# Patient Record
Sex: FEMALE | Race: WHITE | NOT HISPANIC OR LATINO | Employment: FULL TIME | URBAN - METROPOLITAN AREA
[De-identification: names, ages, dates, MRNs, and addresses within clinical notes are randomized per-mention and may not be internally consistent; named-entity substitution may affect disease eponyms.]

---

## 2017-01-18 DIAGNOSIS — N60.09 SOLITARY CYST OF BREAST: ICD-10-CM

## 2017-02-01 ENCOUNTER — HOSPITAL ENCOUNTER (OUTPATIENT)
Dept: ULTRASOUND IMAGING | Facility: CLINIC | Age: 42
Discharge: HOME | End: 2017-02-01
Payer: COMMERCIAL

## 2017-02-01 DIAGNOSIS — N60.09 SOLITARY CYST OF BREAST: ICD-10-CM

## 2017-02-01 PROCEDURE — 76642 ULTRASOUND BREAST LIMITED: CPT

## 2017-05-26 ENCOUNTER — ALLSCRIPTS OFFICE VISIT (OUTPATIENT)
Dept: OTHER | Facility: OTHER | Age: 42
End: 2017-05-26

## 2017-07-26 ENCOUNTER — HOSPITAL ENCOUNTER (OUTPATIENT)
Dept: ULTRASOUND IMAGING | Facility: CLINIC | Age: 42
Discharge: HOME | End: 2017-07-26
Payer: COMMERCIAL

## 2017-07-26 ENCOUNTER — HOSPITAL ENCOUNTER (OUTPATIENT)
Dept: MAMMOGRAPHY | Facility: CLINIC | Age: 42
Discharge: HOME | End: 2017-07-26
Payer: COMMERCIAL

## 2017-07-26 DIAGNOSIS — Z12.31 ENCOUNTER FOR SCREENING MAMMOGRAM FOR MALIGNANT NEOPLASM OF BREAST: ICD-10-CM

## 2017-07-26 DIAGNOSIS — R92.8 ABNORMAL MAMMOGRAM, UNSPECIFIED: ICD-10-CM

## 2017-07-26 PROCEDURE — 76642 ULTRASOUND BREAST LIMITED: CPT

## 2017-07-26 PROCEDURE — G0202 SCR MAMMO BI INCL CAD: HCPCS

## 2017-07-26 PROCEDURE — 77063 BREAST TOMOSYNTHESIS BI: CPT

## 2017-07-31 ENCOUNTER — HOSPITAL ENCOUNTER (OUTPATIENT)
Dept: ULTRASOUND IMAGING | Facility: CLINIC | Age: 42
Discharge: HOME | End: 2017-07-31
Payer: COMMERCIAL

## 2017-07-31 ENCOUNTER — HOSPITAL ENCOUNTER (OUTPATIENT)
Dept: MAMMOGRAPHY | Facility: CLINIC | Age: 42
Discharge: HOME | End: 2017-07-31
Payer: COMMERCIAL

## 2017-07-31 ENCOUNTER — HOSPITAL ENCOUNTER (OUTPATIENT)
Dept: ULTRASOUND IMAGING | Facility: CLINIC | Age: 42
Discharge: HOME | End: 2017-07-31
Admitting: RADIOLOGY
Payer: COMMERCIAL

## 2017-07-31 VITALS — DIASTOLIC BLOOD PRESSURE: 64 MMHG | HEART RATE: 68 BPM | SYSTOLIC BLOOD PRESSURE: 118 MMHG

## 2017-07-31 DIAGNOSIS — R92.8 ABNORMAL FINDING ON BREAST IMAGING: ICD-10-CM

## 2017-07-31 DIAGNOSIS — N63.0 BREAST LUMP: ICD-10-CM

## 2017-07-31 DIAGNOSIS — R92.8 ABNORMAL MAMMOGRAM, UNSPECIFIED: ICD-10-CM

## 2017-07-31 PROCEDURE — 19083 BX BREAST 1ST LESION US IMAG: CPT

## 2017-07-31 PROCEDURE — 88305 TISSUE EXAM BY PATHOLOGIST: CPT | Performed by: OBSTETRICS & GYNECOLOGY

## 2017-07-31 RX ORDER — LIDOCAINE HYDROCHLORIDE 10 MG/ML
4 INJECTION, SOLUTION INFILTRATION; PERINEURAL ONCE
Status: COMPLETED | OUTPATIENT
Start: 2017-07-31 | End: 2017-07-31

## 2017-07-31 RX ADMIN — LIDOCAINE HYDROCHLORIDE 4 ML: 10 INJECTION, SOLUTION INFILTRATION; PERINEURAL at 14:40

## 2017-08-31 DIAGNOSIS — N63.0 BREAST LUMP: ICD-10-CM

## 2018-01-12 VITALS
SYSTOLIC BLOOD PRESSURE: 102 MMHG | DIASTOLIC BLOOD PRESSURE: 70 MMHG | HEIGHT: 66 IN | BODY MASS INDEX: 20.61 KG/M2 | WEIGHT: 128.25 LBS

## 2018-02-19 ENCOUNTER — HOSPITAL ENCOUNTER (OUTPATIENT)
Dept: ULTRASOUND IMAGING | Facility: CLINIC | Age: 43
Discharge: HOME | End: 2018-02-19
Payer: COMMERCIAL

## 2018-02-19 DIAGNOSIS — R92.8 ABNORMAL FINDINGS ON DIAGNOSTIC IMAGING OF BREAST: ICD-10-CM

## 2018-02-19 PROCEDURE — 76642 ULTRASOUND BREAST LIMITED: CPT

## 2018-03-24 ENCOUNTER — OFFICE VISIT (OUTPATIENT)
Dept: URGENT CARE | Age: 43
End: 2018-03-24
Payer: COMMERCIAL

## 2018-03-24 VITALS
RESPIRATION RATE: 18 BRPM | WEIGHT: 128 LBS | SYSTOLIC BLOOD PRESSURE: 120 MMHG | HEART RATE: 76 BPM | HEIGHT: 66 IN | BODY MASS INDEX: 20.57 KG/M2 | DIASTOLIC BLOOD PRESSURE: 70 MMHG | OXYGEN SATURATION: 99 % | TEMPERATURE: 97.6 F

## 2018-03-24 DIAGNOSIS — T78.40XA ALLERGIC REACTION, INITIAL ENCOUNTER: Primary | ICD-10-CM

## 2018-03-24 PROCEDURE — G0382 LEV 3 HOSP TYPE B ED VISIT: HCPCS | Performed by: FAMILY MEDICINE

## 2018-03-24 PROCEDURE — S9083 URGENT CARE CENTER GLOBAL: HCPCS | Performed by: FAMILY MEDICINE

## 2018-03-24 RX ORDER — METHYLPREDNISOLONE 4 MG/1
TABLET ORAL
Qty: 21 TABLET | Refills: 0 | Status: SHIPPED | OUTPATIENT
Start: 2018-03-24 | End: 2018-08-08

## 2018-03-24 NOTE — PROGRESS NOTES
Assessment/Plan:     Diagnoses and all orders for this visit:    Allergic reaction, initial encounter  -     Methylprednisolone 4 MG TBPK; Use as directed on package  -     F/u with your pcp      Other orders  -     sertraline (ZOLOFT) 50 mg tablet; Take 1 tablet by mouth daily        Subjective:  Patient presents to the clinic with c/o of discomfort to her face     Patient ID: Beatriz Sandra is a 36 y o  female  HPI  She reports an allergy to penicillin and says her children are currently taking amoxicillin  She uses gloves when administering the meds but has had some reaction to this in the past  Currently she reports feeling of swelling, slightly increased thickness of the lips, some numbness of her facial muscles B/L, increased feel of discomfort in her throat  However no actual difficulty with breathing  No C P, SOB, abd pain, rash, sneezing, runny nose, sinus pain or pressure or dizziness  The following portions of the patient's history were reviewed and updated as appropriate: allergies, current medications, past family history, past medical history, past social history, past surgical history and problem list     Review of Systems   Constitutional: Negative for appetite change, chills, fatigue and fever  HENT: Positive for facial swelling  Negative for congestion, ear discharge, hearing loss, nosebleeds, rhinorrhea, sinus pain, sinus pressure, sneezing, sore throat and trouble swallowing  Eyes: Negative for pain, discharge, redness and itching  Respiratory: Negative for cough, chest tightness, shortness of breath and wheezing  Cardiovascular: Negative for chest pain and palpitations  Gastrointestinal: Negative for abdominal pain, diarrhea, nausea and vomiting  Skin: Negative for pallor and rash  Neurological: Positive for numbness (mild, facial muscle, but no facial muscle weakness)  Negative for dizziness, syncope, facial asymmetry, light-headedness and headaches  Objective:    /70 (BP Location: Right arm, Patient Position: Sitting, Cuff Size: Standard)   Pulse 76   Temp 97 6 °F (36 4 °C) (Temporal)   Resp 18   Ht 5' 6" (1 676 m)   Wt 58 1 kg (128 lb)   LMP 03/03/2018   SpO2 99%   BMI 20 66 kg/m²          Physical Exam   Constitutional: She is oriented to person, place, and time  She appears well-developed and well-nourished  No distress  HENT:   Head: Normocephalic and atraumatic  Right Ear: External ear normal    Left Ear: External ear normal    Mouth/Throat: No oropharyngeal exudate  The lips seems to be slightly swollen, more on the lowe lip  Eyelids are WNL  Facial muscles look puffy, mild  No skin break  No rep distress  Eyes: Conjunctivae are normal  Pupils are equal, round, and reactive to light  Right eye exhibits no discharge  Left eye exhibits no discharge  Neck: Normal range of motion  Neck supple  No tracheal deviation present  Cardiovascular: Normal rate, regular rhythm and normal heart sounds  No murmur heard  Pulmonary/Chest: Effort normal and breath sounds normal  No respiratory distress  She has no wheezes  Musculoskeletal: Normal range of motion  She exhibits no edema  Lymphadenopathy:     She has no cervical adenopathy  Neurological: She is alert and oriented to person, place, and time  Skin: She is not diaphoretic  Psychiatric: She has a normal mood and affect   Her behavior is normal  Judgment and thought content normal

## 2018-03-24 NOTE — PATIENT INSTRUCTIONS
Anaphylaxis   WHAT YOU NEED TO KNOW:   Anaphylaxis is a life-threatening allergic reaction that must be treated immediately  Your risk for anaphylaxis increases if you have asthma that is severe or not controlled  Medical conditions such as heart disease can also increase your risk  It is important to be prepared if you are at risk for anaphylaxis  Your symptoms can be worse each time you are exposed to the trigger  DISCHARGE INSTRUCTIONS:   Steps to take for signs or symptoms of anaphylaxis:   · Immediately  give 1 shot of epinephrine only into the outer thigh muscle  · Leave the shot in place  as directed  Your healthcare provider may recommend you leave it in place for up to 10 seconds before you remove it  This helps make sure all of the epinephrine is delivered  · Call 911 and go to the emergency department,  even if the shot improved symptoms  Do not drive yourself  Bring the used epinephrine shot with you  Call 911 for any of the following:   · You have a skin rash, hives, swelling, or itching  · You have trouble breathing, shortness of breath, wheezing, or coughing  · Your throat tightens or your lips or tongue swell  · You have difficulty swallowing or speaking  · You are dizzy, lightheaded, confused, or feel like you are going to faint  · You have nausea, diarrhea, or abdominal cramps, or you are vomiting  Return to the emergency department if:   · Signs or symptoms of anaphylaxis return  Contact your healthcare provider if:   · You have questions or concerns about your condition or care  Medicines:   · Epinephrine  is used to treat severe allergic reactions such as anaphylaxis  · Medicines  such as antihistamines, steroids, and bronchodilators decrease inflammation, open airways, and make breathing easier  · Take your medicine as directed  Contact your healthcare provider if you think your medicine is not helping or if you have side effects   Tell him or her if you are allergic to any medicine  Keep a list of the medicines, vitamins, and herbs you take  Include the amounts, and when and why you take them  Bring the list or the pill bottles to follow-up visits  Carry your medicine list with you in case of an emergency  Follow up with your healthcare provider as directed: Allergy testing may reveal allergies that can trigger anaphylaxis  Write down your questions so you remember to ask them during your visits  Safety precautions:   · Keep 2 shots of epinephrine with you at all times  You may need a second shot, because epinephrine only works for about 20 minutes and symptoms may return  Your healthcare provider can show you and family members how to give the shot  Check the expiration date every month and replace it before it expires  · Create an action plan  Your healthcare provider can help you create a written plan that explains the allergy and an emergency plan to treat a reaction  The plan explains when to give a second epinephrine shot if symptoms return or do not improve after the first  Give copies of the action plan and emergency instructions to family members, work and school staff, and  providers  Show them how to give a shot of epinephrine  · Be careful when you exercise  If you have had exercise-induced anaphylaxis, do not exercise right after you eat  Stop exercising right away if you start to develop any signs or symptoms of anaphylaxis  You may first feel tired, warm, or have itchy skin  Hives, swelling, and severe breathing problems may develop if you continue to exercise  · Carry medical alert identification  Wear medical alert jewelry or carry a card that explains the allergy  Ask your healthcare provider where to get these items  · Identify and avoid known triggers  Read food labels for ingredients  Look for triggers in your environment  · Ask about treatments to prevent anaphylaxis    You may need allergy shots or other medicines to treat allergies  © 2017 [facility] Information is for End User's use only and may not be sold, redistributed or otherwise used for commercial purposes  All illustrations and images included in CareNotes® are the copyrighted property of A D A M [employer]  or [employer]  The above information is an  only  It is not intended as medical advice for individual conditions or treatments  Talk to your doctor, nurse or pharmacist before following any medical regimen to see if it is safe and effective for you

## 2018-07-18 ENCOUNTER — ANNUAL EXAM (OUTPATIENT)
Dept: OBGYN CLINIC | Facility: CLINIC | Age: 43
End: 2018-07-18
Payer: COMMERCIAL

## 2018-07-18 VITALS
BODY MASS INDEX: 20.97 KG/M2 | SYSTOLIC BLOOD PRESSURE: 100 MMHG | WEIGHT: 133.6 LBS | DIASTOLIC BLOOD PRESSURE: 68 MMHG | HEIGHT: 67 IN

## 2018-07-18 DIAGNOSIS — Z01.419 WOMEN'S ANNUAL ROUTINE GYNECOLOGICAL EXAMINATION: Primary | ICD-10-CM

## 2018-07-18 DIAGNOSIS — R92.8 ABNORMAL SCREENING MAMMOGRAM: ICD-10-CM

## 2018-07-18 PROCEDURE — S0612 ANNUAL GYNECOLOGICAL EXAMINA: HCPCS | Performed by: OBSTETRICS & GYNECOLOGY

## 2018-07-18 NOTE — PATIENT INSTRUCTIONS
The patient was informed of a stable gyn examination  No Pap smear was performed  She will watch for regarding severe she will wash the above Clomid for state  She may consider using Lysteda in the future  She also watch for any return of the premenstrual syndrome  If the above we may consider restarting Zoloft will keep me posted

## 2018-07-18 NOTE — PROGRESS NOTES
This is a 41-year-old white female she is a  2 para 2 2 prior vaginal deliveries  Her current method of contraception includes withdrawal   Year function 1 year above the premenstrual syndrome  She did times all 4 few months  She denied the side effects to the side effects improved to use the dosage  She is still happy with weight loss  She has now moved to the dose of her better  Menstrual cycles of applicable  She does with this sometimes heavy new she was offered medication the heavy this was interfering with her lifestyle  The present time she will do weight  She denies any other particular gynecological  GI complaint  She denied with a possible filling cyst of July with sheath that was all the key  There is no bowel with intimacy  There are no new major family illnesses  to report  Review of systems patient does admit 1st day menstrual cycle was heavy  She has done screening medical therapy at the present time  She also has a history of possible or warmth right ovarian cyst earlier this month  She will continue to watch it symptoms returned she will contact me  Surgical history is benign  Medical history is is benign she is severely allergic to penicillin      Social history she is a former smoker  Social alcohol      Family history is positive for gout, and hereditary spherocytosis, and hypertension      Physical exam well-developed well-nourished petite white female acute distress HEENT is within normal limits  Cardiac exam shows a regular rhythm and rate normal S1-S2 lungs clear to A&P breast exam symmetrical nontender no masses with retraction  Axilla clear bilaterally  Pelvic exam the external genitalia normal limits vagina is clean uterus is small mobile nontender the cervix is parous adnexa clear there is no cervical motion tenderness  A Pap smear was not performed because of prior history being HPV negative year   Impression stable gyn examination    No Pap smear   I do lesion my a probable ruptured ovarian cyst to continue to monitor pain worsens to contact me for further evaluation  She will talk to her family physician about so dizzy spells  It does not seem related to her menstrual cycle  She has a history of abnormal mammogram she will follow up next month along with an ultrasound of the left breast   She should return my office in 1 year

## 2018-08-06 ENCOUNTER — TELEPHONE (OUTPATIENT)
Dept: INTERNAL MEDICINE CLINIC | Facility: CLINIC | Age: 43
End: 2018-08-06

## 2018-08-06 NOTE — TELEPHONE ENCOUNTER
Pt last seen 2016  Poss hernia   Uncomfortable when sitting especially in the abdomen   Started noticing discomfort in the beginning of June   She does weight lifting 20lbs  She would like to be seen  Please advise   Thanks    Declines: bowel issues, fever   Pt tele: [phone]

## 2018-08-07 NOTE — TELEPHONE ENCOUNTER
Sp/w pt and is ok to see Reggie Martinez, she is driving now and will call Dr Butler's office directly for avail appts

## 2018-08-08 ENCOUNTER — TELEPHONE (OUTPATIENT)
Dept: INTERNAL MEDICINE CLINIC | Facility: CLINIC | Age: 43
End: 2018-08-08

## 2018-08-08 ENCOUNTER — APPOINTMENT (OUTPATIENT)
Dept: LAB | Facility: CLINIC | Age: 43
End: 2018-08-08
Payer: COMMERCIAL

## 2018-08-08 ENCOUNTER — OFFICE VISIT (OUTPATIENT)
Dept: INTERNAL MEDICINE CLINIC | Facility: CLINIC | Age: 43
End: 2018-08-08
Payer: COMMERCIAL

## 2018-08-08 VITALS
HEIGHT: 66 IN | WEIGHT: 133.4 LBS | TEMPERATURE: 98 F | HEART RATE: 64 BPM | OXYGEN SATURATION: 100 % | BODY MASS INDEX: 21.44 KG/M2 | DIASTOLIC BLOOD PRESSURE: 72 MMHG | SYSTOLIC BLOOD PRESSURE: 118 MMHG | RESPIRATION RATE: 16 BRPM

## 2018-08-08 DIAGNOSIS — K43.9 VENTRAL HERNIA WITHOUT OBSTRUCTION OR GANGRENE: ICD-10-CM

## 2018-08-08 DIAGNOSIS — R11.0 NAUSEA: ICD-10-CM

## 2018-08-08 DIAGNOSIS — R10.11 COLICKY RUQ ABDOMINAL PAIN: ICD-10-CM

## 2018-08-08 DIAGNOSIS — R10.11 COLICKY RUQ ABDOMINAL PAIN: Primary | ICD-10-CM

## 2018-08-08 DIAGNOSIS — D58.0 HEREDITARY SPHEROCYTOSIS (HCC): ICD-10-CM

## 2018-08-08 LAB
ALBUMIN SERPL BCP-MCNC: 4.5 G/DL (ref 3.5–5)
ALP SERPL-CCNC: 55 U/L (ref 46–116)
ALT SERPL W P-5'-P-CCNC: 29 U/L (ref 12–78)
ANION GAP SERPL CALCULATED.3IONS-SCNC: 5 MMOL/L (ref 4–13)
AST SERPL W P-5'-P-CCNC: 18 U/L (ref 5–45)
BASOPHILS # BLD AUTO: 0.03 THOUSANDS/ΜL (ref 0–0.1)
BASOPHILS NFR BLD AUTO: 1 % (ref 0–1)
BILIRUB SERPL-MCNC: 1.5 MG/DL (ref 0.2–1)
BILIRUB UR QL STRIP: NEGATIVE
BUN SERPL-MCNC: 11 MG/DL (ref 5–25)
CALCIUM SERPL-MCNC: 8.5 MG/DL (ref 8.3–10.1)
CHLORIDE SERPL-SCNC: 105 MMOL/L (ref 100–108)
CLARITY UR: CLEAR
CO2 SERPL-SCNC: 29 MMOL/L (ref 21–32)
COLOR UR: YELLOW
CREAT SERPL-MCNC: 0.7 MG/DL (ref 0.6–1.3)
EOSINOPHIL # BLD AUTO: 0.03 THOUSAND/ΜL (ref 0–0.61)
EOSINOPHIL NFR BLD AUTO: 1 % (ref 0–6)
ERYTHROCYTE [DISTWIDTH] IN BLOOD BY AUTOMATED COUNT: 15.3 % (ref 11.6–15.1)
GFR SERPL CREATININE-BSD FRML MDRD: 106 ML/MIN/1.73SQ M
GLUCOSE SERPL-MCNC: 95 MG/DL (ref 65–140)
GLUCOSE UR STRIP-MCNC: NEGATIVE MG/DL
HCT VFR BLD AUTO: 34.3 % (ref 34.8–46.1)
HGB BLD-MCNC: 11.9 G/DL (ref 11.5–15.4)
HGB UR QL STRIP.AUTO: NEGATIVE
IMM GRANULOCYTES # BLD AUTO: 0.01 THOUSAND/UL (ref 0–0.2)
IMM GRANULOCYTES NFR BLD AUTO: 0 % (ref 0–2)
KETONES UR STRIP-MCNC: NEGATIVE MG/DL
LEUKOCYTE ESTERASE UR QL STRIP: NEGATIVE
LIPASE SERPL-CCNC: 103 U/L (ref 73–393)
LYMPHOCYTES # BLD AUTO: 0.88 THOUSANDS/ΜL (ref 0.6–4.47)
LYMPHOCYTES NFR BLD AUTO: 16 % (ref 14–44)
MCH RBC QN AUTO: 30.1 PG (ref 26.8–34.3)
MCHC RBC AUTO-ENTMCNC: 34.7 G/DL (ref 31.4–37.4)
MCV RBC AUTO: 87 FL (ref 82–98)
MONOCYTES # BLD AUTO: 0.29 THOUSAND/ΜL (ref 0.17–1.22)
MONOCYTES NFR BLD AUTO: 5 % (ref 4–12)
NEUTROPHILS # BLD AUTO: 4.37 THOUSANDS/ΜL (ref 1.85–7.62)
NEUTS SEG NFR BLD AUTO: 77 % (ref 43–75)
NITRITE UR QL STRIP: NEGATIVE
NRBC BLD AUTO-RTO: 0 /100 WBCS
PH UR STRIP.AUTO: 7 [PH] (ref 4.5–8)
PLATELET # BLD AUTO: 141 THOUSANDS/UL (ref 149–390)
PMV BLD AUTO: 9.6 FL (ref 8.9–12.7)
POTASSIUM SERPL-SCNC: 4.3 MMOL/L (ref 3.5–5.3)
PROT SERPL-MCNC: 7.1 G/DL (ref 6.4–8.2)
PROT UR STRIP-MCNC: NEGATIVE MG/DL
RBC # BLD AUTO: 3.96 MILLION/UL (ref 3.81–5.12)
SODIUM SERPL-SCNC: 139 MMOL/L (ref 136–145)
SP GR UR STRIP.AUTO: <=1.005 (ref 1–1.03)
UROBILINOGEN UR QL STRIP.AUTO: 0.2 E.U./DL
WBC # BLD AUTO: 5.61 THOUSAND/UL (ref 4.31–10.16)

## 2018-08-08 PROCEDURE — 36415 COLL VENOUS BLD VENIPUNCTURE: CPT | Performed by: INTERNAL MEDICINE

## 2018-08-08 PROCEDURE — 80053 COMPREHEN METABOLIC PANEL: CPT

## 2018-08-08 PROCEDURE — 3008F BODY MASS INDEX DOCD: CPT | Performed by: INTERNAL MEDICINE

## 2018-08-08 PROCEDURE — 81003 URINALYSIS AUTO W/O SCOPE: CPT | Performed by: INTERNAL MEDICINE

## 2018-08-08 PROCEDURE — 83690 ASSAY OF LIPASE: CPT

## 2018-08-08 PROCEDURE — 85025 COMPLETE CBC W/AUTO DIFF WBC: CPT | Performed by: INTERNAL MEDICINE

## 2018-08-08 PROCEDURE — 99214 OFFICE O/P EST MOD 30 MIN: CPT | Performed by: INTERNAL MEDICINE

## 2018-08-08 NOTE — PROGRESS NOTES
Assessment/Plan:     Diagnoses and all orders for this visit:    Colicky RUQ abdominal pain  Comments:  sx concerning for cholecystitis/gallstones but cannot r/o pancreatitis, colitis  US and BW Ordered & precautions advised  Orders:  -     US right upper quadrant; Future  -     Comprehensive metabolic panel; Future  -     CBC and differential  -     Lipase; Future  -     UA w Reflex to Microscopic w Reflex to Culture -Lab Collect    Nausea  -     US right upper quadrant; Future  -     Comprehensive metabolic panel; Future  -     CBC and differential  -     Lipase; Future  -     UA w Reflex to Microscopic w Reflex to Culture -Lab Collect    Hereditary spherocytosis (HCC)  Comments:  advised on supplemental folic acid to take every day  f/u with PCP/Dr Garcia    Ventral hernia without obstruction or gangrene  Comments:  small, non-tender on exam but reducible  precautions advised    Other orders  -     Cancel: Comprehensive metabolic panel; Future  -     Cancel: CBC and differential  -     Cancel: Lipase; Future  -     Cancel: UA w Reflex to Microscopic w Reflex to Culture -Lab Collect          Subjective:      Patient ID: Cris Sierra is a 39 y o  female  HPI    New to me here with c/o right sided abd pain off/on for 2 months, sees Dr Lisa Mckeon as PCP  Sx intermittent but worsening and with occ mild nausea but no vomiting, fever, change in bowels or severe pain  No abd surgery in past   Believes she may have small ventral hernia from years ago, not addressed  Right sided abd pain worse after eating meal   Hx of hereditary spherocytosis - not taking folic acid supplement for some time  No pain today and no other complaints      Past Medical History:   Diagnosis Date    Allergic     Anemia     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 ([facility] [address] )     Mitral valve prolapse      Vitals:    08/08/18 0950   BP: 118/72   Pulse: 64   Resp: 16   Temp: 98 °F (36 7 °C)   SpO2: 100%   Weight: 60 5 kg (133 lb 6 4 oz) Height: 5' 6" (1 676 m)     Body mass index is 21 53 kg/m²  No current outpatient prescriptions on file  Allergies   Allergen Reactions    Penicillins Anaphylaxis and Hives    Cephalexin          Review of Systems   Constitutional: Negative for fever  HENT: Negative for congestion  Eyes: Negative for visual disturbance  Respiratory: Negative for shortness of breath  Cardiovascular: Negative for chest pain  Gastrointestinal: Positive for abdominal pain and nausea  Negative for blood in stool, constipation, diarrhea and vomiting  Endocrine: Negative for polyuria  Genitourinary: Negative for difficulty urinating  Musculoskeletal: Negative for arthralgias  Neurological: Negative for headaches  Psychiatric/Behavioral: Negative for dysphoric mood  Objective:      /72   Pulse 64   Temp 98 °F (36 7 °C)   Resp 16   Ht 5' 6" (1 676 m)   Wt 60 5 kg (133 lb 6 4 oz)   LMP 07/11/2018 (Exact Date)   SpO2 100%   BMI 21 53 kg/m²          Physical Exam   Constitutional: She appears well-developed and well-nourished  HENT:   Head: Normocephalic and atraumatic  Mouth/Throat: Oropharynx is clear and moist    Eyes: Conjunctivae are normal  Pupils are equal, round, and reactive to light  Cardiovascular: Normal rate, regular rhythm and normal heart sounds  No murmur heard  Pulmonary/Chest: Effort normal and breath sounds normal  She has no wheezes  She has no rales  Abdominal: Soft  Bowel sounds are normal  There is no tenderness  There is no rigidity, no rebound, no guarding and negative Mckenna's sign  A hernia is present  Hernia confirmed positive in the ventral area (see diagram)  Musculoskeletal: She exhibits no edema  Neurological: She is alert  Psychiatric: She has a normal mood and affect  Her behavior is normal    Vitals reviewed

## 2018-08-08 NOTE — TELEPHONE ENCOUNTER
BW and urine tests are back    Results look fine except for mild increase in bilirubin    Pls continue with current plan & Ultrasound test    Results for orders placed or performed in visit on 08/08/18   Comprehensive metabolic panel   Result Value Ref Range    Sodium 139 136 - 145 mmol/L    Potassium 4 3 3 5 - 5 3 mmol/L    Chloride 105 100 - 108 mmol/L    CO2 29 21 - 32 mmol/L    Anion Gap 5 4 - 13 mmol/L    BUN 11 5 - 25 mg/dL    Creatinine 0 70 0 60 - 1 30 mg/dL    Glucose 95 65 - 140 mg/dL    Calcium 8 5 8 3 - 10 1 mg/dL    AST 18 5 - 45 U/L    ALT 29 12 - 78 U/L    Alkaline Phosphatase 55 46 - 116 U/L    Total Protein 7 1 6 4 - 8 2 g/dL    Albumin 4 5 3 5 - 5 0 g/dL    Total Bilirubin 1 50 (H) 0 20 - 1 00 mg/dL    eGFR 106 ml/min/1 73sq m   Lipase   Result Value Ref Range    Lipase 103 73 - 393 u/L

## 2018-08-08 NOTE — PATIENT INSTRUCTIONS
1  Blood work today  2  Ultrasound today  3  Low fat diet  4  If symptoms worsen, go to Emergency Room  5  I will call you with results later today    Abdominal Pain   AMBULATORY CARE:   Abdominal pain  can be dull, achy, or sharp  You may have pain in one area of your abdomen, or in your entire abdomen  Your pain may be caused by a condition such as constipation, food sensitivity or poisoning, infection, or a blockage  Abdominal pain can also be from a hernia, appendicitis, or an ulcer  Liver, gallbladder, or kidney conditions can also cause abdominal pain  The cause of your abdominal pain may be unknown  Seek care immediately if:   · You have new chest pain or shortness of breath  · You have pulsing pain in your upper abdomen or lower back that suddenly becomes constant  · Your pain is in the right lower abdominal area and worsens with movement  · You have a fever over 100 4°F (38°C) or shaking chills  · You are vomiting and cannot keep food or liquids down  · Your pain does not improve or gets worse over the next 8 to 12 hours  · You see blood in your vomit or bowel movements, or they look black and tarry  · Your skin or the whites of your eyes turn yellow  · You are a woman and have a large amount of vaginal bleeding that is not your monthly period  Contact your healthcare provider if:   · You have pain in your lower back  · You are a man and have pain in your testicles  · You have pain when you urinate  · You have questions or concerns about your condition or care  Treatment for abdominal pain  may include medicine to calm your stomach, prevent vomiting, or decrease pain  Follow up with your healthcare provider as directed:  Write down your questions so you remember to ask them during your visits  © 2017 [address] [employer] Information is for End User's use only and may not be sold, redistributed or otherwise used for commercial purposes   All illustrations and images included in CareNotes® are the copyrighted property of [employer] Inc  or Jeth[employer] Ferrer  The above information is an  only  It is not intended as medical advice for individual conditions or treatments  Talk to your doctor, nurse or pharmacist before following any medical regimen to see if it is safe and effective for you

## 2018-08-09 ENCOUNTER — HOSPITAL ENCOUNTER (OUTPATIENT)
Dept: RADIOLOGY | Age: 43
Discharge: HOME | End: 2018-08-09
Payer: COMMERCIAL

## 2018-08-09 ENCOUNTER — DOCUMENTATION (OUTPATIENT)
Dept: INTERNAL MEDICINE CLINIC | Facility: CLINIC | Age: 43
End: 2018-08-09

## 2018-08-09 ENCOUNTER — TELEPHONE (OUTPATIENT)
Dept: INTERNAL MEDICINE CLINIC | Facility: CLINIC | Age: 43
End: 2018-08-09

## 2018-08-09 DIAGNOSIS — D58.0 HEREDITARY SPHEROCYTOSIS (HCC): Primary | ICD-10-CM

## 2018-08-09 DIAGNOSIS — R10.11 COLICKY RUQ ABDOMINAL PAIN: ICD-10-CM

## 2018-08-09 DIAGNOSIS — R11.0 NAUSEA: ICD-10-CM

## 2018-08-09 DIAGNOSIS — R17 ELEVATED BILIRUBIN: ICD-10-CM

## 2018-08-09 PROCEDURE — 76705 ECHO EXAM OF ABDOMEN: CPT

## 2018-08-09 NOTE — TELEPHONE ENCOUNTER
Spoke to patient    Reviewed US results    Pt still having off/on discomfort symptoms in RUQ    US neg for sludge or gallstones    Hx of hereditary spherocytosis    Plan - HIDA scan & provided phone # to call/schedule on pt's cell VM         Strict precautions advised

## 2018-08-09 NOTE — TELEPHONE ENCOUNTER
US is back and looks fine, no evidence of gallstones or gallbladder inflammation    There was a small 3 mm area on right kidney which appears to be an enlarged blood vessel or area of fatty tissue  Should repeat ultrasound in 6 months to follow this up, can do this with Dr Rosalba Ball    Is Mavis Solo still having pain?   If yes, recommend CT scan of abdomen    Let me know

## 2018-08-09 NOTE — TELEPHONE ENCOUNTER
States she just feels a discomfort on Right side of abdomen on and off for the past 2 months  Patient would like to know if 3 mm area on R kidney may be the cause of her discomfort

## 2018-08-10 ENCOUNTER — DOCUMENTATION (OUTPATIENT)
Dept: INTERNAL MEDICINE CLINIC | Facility: CLINIC | Age: 43
End: 2018-08-10

## 2018-08-10 NOTE — PROGRESS NOTES
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

## 2018-08-13 DIAGNOSIS — E53.8 FOLIC ACID DEFICIENCY: Primary | ICD-10-CM

## 2018-08-13 RX ORDER — FOLIC ACID 1 MG/1
1 TABLET ORAL DAILY
Qty: 90 TABLET | Refills: 3 | Status: SHIPPED | OUTPATIENT
Start: 2018-08-13 | End: 2021-12-28

## 2018-08-14 ENCOUNTER — TELEPHONE (OUTPATIENT)
Dept: INTERNAL MEDICINE CLINIC | Facility: CLINIC | Age: 43
End: 2018-08-14

## 2018-08-15 NOTE — TELEPHONE ENCOUNTER
PT IS ALREADY AWARE OF HER US RESULTS AS DR KLEIN WENT OVER THEM WITH HER  I DID LEAVE HER A MESSAGE LETTING HER KNOW THAT DR Lula Hdz SAID THAT SHE HAS A LONG STANDING MILD INCREASE IN HER BILIRUBIN WHICH MEANS NOTHING AND THAT HE AGREES WITH EVERYTHING DR KLEIN SAID/DID  I ALSO TOLD HER TO STILL HAVE THE HIDA SCAN DONE AND WE'LL WAIT FOR THOSE RESULTS AND GO FROM THERE   I TOLD PT TO CALL BACK IF SHE HAS ANY QUESTIONS

## 2018-09-07 ENCOUNTER — HOSPITAL ENCOUNTER (OUTPATIENT)
Dept: NON INVASIVE DIAGNOSTICS | Facility: CLINIC | Age: 43
Discharge: HOME | End: 2018-09-07
Payer: COMMERCIAL

## 2018-09-07 ENCOUNTER — HOSPITAL ENCOUNTER (OUTPATIENT)
Dept: MAMMOGRAPHY | Facility: CLINIC | Age: 43
Discharge: HOME | End: 2018-09-07
Payer: COMMERCIAL

## 2018-09-07 ENCOUNTER — HOSPITAL ENCOUNTER (OUTPATIENT)
Dept: ULTRASOUND IMAGING | Facility: CLINIC | Age: 43
Discharge: HOME | End: 2018-09-07
Payer: COMMERCIAL

## 2018-09-07 ENCOUNTER — TELEPHONE (OUTPATIENT)
Dept: INTERNAL MEDICINE CLINIC | Facility: CLINIC | Age: 43
End: 2018-09-07

## 2018-09-07 DIAGNOSIS — R92.8 FOLLOW-UP EXAMINATION OF ABNORMAL MAMMOGRAM: ICD-10-CM

## 2018-09-07 DIAGNOSIS — D58.0 HEREDITARY SPHEROCYTOSIS (HCC): ICD-10-CM

## 2018-09-07 DIAGNOSIS — R10.11 COLICKY RUQ ABDOMINAL PAIN: ICD-10-CM

## 2018-09-07 DIAGNOSIS — R92.8 ABNORMAL FINDING ON MAMMOGRAPHY: ICD-10-CM

## 2018-09-07 DIAGNOSIS — R17 ELEVATED BILIRUBIN: ICD-10-CM

## 2018-09-07 PROCEDURE — G0279 TOMOSYNTHESIS, MAMMO: HCPCS

## 2018-09-07 PROCEDURE — 77066 DX MAMMO INCL CAD BI: CPT

## 2018-09-07 PROCEDURE — A9537 TC99M MEBROFENIN: HCPCS

## 2018-09-07 PROCEDURE — 78227 HEPATOBIL SYST IMAGE W/DRUG: CPT

## 2018-09-07 PROCEDURE — 76642 ULTRASOUND BREAST LIMITED: CPT

## 2018-09-07 RX ADMIN — SINCALIDE 1.2 MCG: 5 INJECTION, POWDER, LYOPHILIZED, FOR SOLUTION INTRAVENOUS at 08:55

## 2018-09-07 NOTE — TELEPHONE ENCOUNTER
Let carlos enrique know - HIDA scan results are back & look fine, no gallbladder inflammation or dysfunction    Is Lashae Hernandez still having abdominal discomfort/pain? If yes, should see a GI specialist or Dr Evon Arellano to follow up   may need further work up or a CAT scan

## 2018-09-10 ENCOUNTER — TELEPHONE (OUTPATIENT)
Dept: OBGYN CLINIC | Facility: CLINIC | Age: 43
End: 2018-09-10

## 2018-09-10 NOTE — TELEPHONE ENCOUNTER
Pt informed of (L) breast u/s results - benign (L) breast nodule - can resume annual 3D mgram screening - can have ABUS f/u "extremely dense" breasts - given dx & cpt codes for same - she will recall office if wants to schedule ABUS

## 2018-09-10 NOTE — TELEPHONE ENCOUNTER
SPOKE W/ PT A ND ADV'D  STATES SHE DOES SOMETIMES HAVE PAIN  NOTICES IT SOMETIMES BEFORE HER PERIOD  RIGHT NOW SHE IS FINE

## 2018-09-14 ENCOUNTER — OFFICE VISIT (OUTPATIENT)
Dept: INTERNAL MEDICINE CLINIC | Facility: CLINIC | Age: 43
End: 2018-09-14
Payer: COMMERCIAL

## 2018-09-14 VITALS
WEIGHT: 132 LBS | BODY MASS INDEX: 21.31 KG/M2 | HEART RATE: 72 BPM | DIASTOLIC BLOOD PRESSURE: 70 MMHG | RESPIRATION RATE: 14 BRPM | SYSTOLIC BLOOD PRESSURE: 98 MMHG

## 2018-09-14 DIAGNOSIS — R10.84 GENERALIZED ABDOMINAL PAIN: Primary | ICD-10-CM

## 2018-09-14 PROBLEM — D58.0 HEREDITARY SPHEROCYTOSIS (HCC): Chronic | Status: ACTIVE | Noted: 2018-08-08

## 2018-09-14 PROBLEM — R10.9 ABDOMINAL PAIN: Chronic | Status: ACTIVE | Noted: 2018-09-14

## 2018-09-14 PROBLEM — R10.9 ABDOMINAL PAIN: Status: ACTIVE | Noted: 2018-09-14

## 2018-09-14 PROBLEM — R17 ELEVATED BILIRUBIN: Chronic | Status: ACTIVE | Noted: 2018-08-09

## 2018-09-14 PROCEDURE — 99214 OFFICE O/P EST MOD 30 MIN: CPT | Performed by: INTERNAL MEDICINE

## 2018-09-14 NOTE — PROGRESS NOTES
Assessment/Plan:  1  Abdominal distention-can be more prominent in the right upper quadrant but can notice it throughout the abdomen-worse with certain positions and worse around her menses  Ultrasound of abdomen benign  HIDA scan negative  CBC SMA lipase normal   Because she drinks well water we need to make sure we do not have any parasites-specifically Giardia  She is concerned about endometriosis although I explained to her that typically that would not give upper abdominal pain although rarely  She will have an ultrasound of her pelvis as well  She will begin Metamucil teaspoon daily as she states her symptoms were worse if there is any tendency towards constipation  She was given Levsin to use sublingually q 4 hours p r n  for abdominal bloating  She will have a stool for Giardia, stool for O& P  She will have an ultrasound of her pelvis  Celiac profile ordered as well as sed rate C-reactive protein  She will be re-evaluated in 6 weeks  2  Abnormal ultrasound the kidney-tiny angiomyolipoma-3 millimeters-right upper pole-needs repeat ultrasound to track this in 6 months which will be due February of 2019 and we reviewed that today  3  Hereditary spherocytosis-continues on folic acid  4  Facial swelling-full discussion as per noted November 2016-asymptomatic allergy blood work then showed allergies to dust mites and ragweed but otherwise normal   She did not reacted dogs which she has at home    All other problems as per prior records  Returning in 6 weeks  Needs summary dictation next visit    Addendum-ultrasound of the pelvis benign other than probable corpus luteal cyst of the left ovary and potential pelvic congestion on the left side-at next visit will make sure patient is being re-evaluated by her gyn      No problem-specific Assessment & Plan notes found for this encounter         Diagnoses and all orders for this visit:    Generalized abdominal pain          Subjective:      Patient ID: Mikael Steven is a 39 y o  female  She was brought in today to go over GI symptoms  She notes some intermittent right upper quadrant fullness-bloating  She says especially around the time [facility] feels as though her abdomen is more protuberant  She says that she does not have severe pain  She can have fullness-mild achiness  Symptoms are most prominent around her menses insert positions such as bending over  She may notice that her abdomen is more distended after exercise  She does not have sharp pain  She does not have pain that wakes her from sleep  She had seen covering physician and had a negative ultrasound of the right upper quadrant HIDA scan is CBC Chem profile and lipase  She does drink well water and she told me today that there was an issue where there was abnormal water found  She is now drinking bottled water  She denies any family history of celiac disease-she denies weight loss  Appetite has been stable  She has not had any fever or bloody stool  She commented that it is noted if at all constipated her bloating is worse  She is worried about potential endometriosis  She EKG notes lower abdominal discomfort  She saw her gyn recently was told exam was benign  She denies episodic abdominal pain  I told her based on history today I was not suspicious for biliary tract disease at this point  The following portions of the patient's history were reviewed and updated as appropriate: current medications, past family history, past medical history, past social history, past surgical history and problem list     Review of Systems   Constitutional: Negative  Respiratory: Negative  Cardiovascular: Negative  Gastrointestinal: Positive for abdominal distention  Endocrine: Negative  Genitourinary: Negative  Musculoskeletal: Negative  Neurological: Negative  Hematological: Negative  Psychiatric/Behavioral: Negative            Objective:      BP 98/70   Pulse 72 Resp 14   Wt 59 9 kg (132 lb)   BMI 21 31 kg/m²          Physical Exam   Constitutional: She is oriented to person, place, and time  She appears well-developed and well-nourished  No distress  HENT:   Head: Normocephalic and atraumatic  Right Ear: External ear normal    Left Ear: External ear normal    Nose: Nose normal    Mouth/Throat: Oropharynx is clear and moist  No oropharyngeal exudate  Eyes: Conjunctivae and EOM are normal  Pupils are equal, round, and reactive to light  Right eye exhibits no discharge  Left eye exhibits no discharge  No scleral icterus  Neck: Normal range of motion  Neck supple  No JVD present  No tracheal deviation present  No thyromegaly present  Cardiovascular: Normal rate, regular rhythm and intact distal pulses  Exam reveals no gallop and no friction rub  No murmur heard  Pulmonary/Chest: Effort normal and breath sounds normal  No respiratory distress  She has no wheezes  She has no rales  She exhibits no tenderness  Abdominal: Soft  Bowel sounds are normal  She exhibits no distension and no mass  There is no tenderness  There is no rebound and no guarding  Genitourinary: Rectal exam shows guaiac negative stool  Musculoskeletal: Normal range of motion  She exhibits no edema or deformity  Lymphadenopathy:     She has no cervical adenopathy  Neurological: She is alert and oriented to person, place, and time  She has normal reflexes  No cranial nerve deficit  She exhibits normal muscle tone  Coordination normal    Skin: Skin is warm and dry  No rash noted  No erythema  Psychiatric: She has a normal mood and affect   Her behavior is normal  Judgment and thought content normal

## 2018-09-28 ENCOUNTER — HOSPITAL ENCOUNTER (OUTPATIENT)
Dept: RADIOLOGY | Age: 43
Discharge: HOME | End: 2018-09-28
Payer: COMMERCIAL

## 2018-09-28 DIAGNOSIS — R10.84 GENERALIZED ABDOMINAL PAIN: ICD-10-CM

## 2018-09-28 PROCEDURE — 76856 US EXAM PELVIC COMPLETE: CPT

## 2018-09-28 PROCEDURE — 76830 TRANSVAGINAL US NON-OB: CPT

## 2018-09-29 ENCOUNTER — TELEPHONE (OUTPATIENT)
Dept: INTERNAL MEDICINE CLINIC | Facility: CLINIC | Age: 43
End: 2018-09-29

## 2018-09-29 NOTE — TELEPHONE ENCOUNTER
----- Message from Srinivasa Hernadez MD sent at 9/29/2018  7:00 AM EDT -----  Please call patient letter no ultrasound of the pelvis benign showing only benign sees the left ovary-Good News

## 2018-10-30 ENCOUNTER — OFFICE VISIT (OUTPATIENT)
Dept: INTERNAL MEDICINE CLINIC | Facility: CLINIC | Age: 43
End: 2018-10-30
Payer: COMMERCIAL

## 2018-10-30 VITALS
DIASTOLIC BLOOD PRESSURE: 80 MMHG | SYSTOLIC BLOOD PRESSURE: 110 MMHG | HEART RATE: 72 BPM | WEIGHT: 132.4 LBS | BODY MASS INDEX: 21.37 KG/M2 | RESPIRATION RATE: 14 BRPM

## 2018-10-30 DIAGNOSIS — D17.9 ANGIOMYOLIPOMA: ICD-10-CM

## 2018-10-30 DIAGNOSIS — R93.5 ABNORMAL US (ULTRASOUND) OF ABDOMEN: Primary | ICD-10-CM

## 2018-10-30 PROBLEM — D17.71 RENAL ANGIOMYOLIPOMA: Chronic | Status: ACTIVE | Noted: 2018-10-30

## 2018-10-30 PROBLEM — D17.71 RENAL ANGIOMYOLIPOMA: Status: ACTIVE | Noted: 2018-10-30

## 2018-10-30 PROCEDURE — 99214 OFFICE O/P EST MOD 30 MIN: CPT | Performed by: INTERNAL MEDICINE
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
## 2019-01-07 ENCOUNTER — OFFICE VISIT (OUTPATIENT)
Dept: URGENT CARE | Age: 44
End: 2019-01-07
Payer: COMMERCIAL

## 2019-01-07 VITALS
WEIGHT: 130 LBS | BODY MASS INDEX: 20.98 KG/M2 | RESPIRATION RATE: 20 BRPM | OXYGEN SATURATION: 100 % | TEMPERATURE: 98.6 F | SYSTOLIC BLOOD PRESSURE: 116 MMHG | DIASTOLIC BLOOD PRESSURE: 56 MMHG | HEART RATE: 73 BPM

## 2019-01-07 DIAGNOSIS — J20.8 ACUTE BRONCHITIS DUE TO OTHER SPECIFIED ORGANISMS: Primary | ICD-10-CM

## 2019-01-07 PROCEDURE — S9083 URGENT CARE CENTER GLOBAL: HCPCS | Performed by: FAMILY MEDICINE

## 2019-01-07 PROCEDURE — G0382 LEV 3 HOSP TYPE B ED VISIT: HCPCS | Performed by: FAMILY MEDICINE

## 2019-01-07 RX ORDER — AZITHROMYCIN 250 MG/1
TABLET, FILM COATED ORAL
Qty: 6 TABLET | Refills: 0 | Status: SHIPPED | OUTPATIENT
Start: 2019-01-07 | End: 2019-01-11

## 2019-01-07 NOTE — LETTER
January 7, 2019     Patient: Jan Rosales   YOB: 1975   Date of Visit: 1/7/2019       To Whom It May Concern: It is my medical opinion that Christina Viera may return to work on 1/8/19  If you have any questions or concerns, please don't hesitate to call           Sincerely,        Sarah Skelton PA-C    CC: No Recipients

## 2019-01-07 NOTE — PATIENT INSTRUCTIONS
Take antibiotics as prescribed  Use nasal spray for symptomatic treatment  Stay hydrated with lots of water/fluids  Follow-up with PCP in the next few days for reexamination and to ensure resolution of symptoms  Go to the ED if any intractable fevers, unable to stay hydrated, abdominal pain, chest pain, shortness of breath, new or worsening symptoms or other concerning symptoms

## 2019-01-07 NOTE — PROGRESS NOTES
[facility]        NAME: Jessica Goncalves is a 39 y o  female  : 1975    MRN: [id number]  DATE: 2019  TIME: 10:05 AM    Assessment and Plan   Acute bronchitis due to other specified organisms [J20 8]  1  Acute bronchitis due to other specified organisms  azithromycin (ZITHROMAX) 250 mg tablet         Patient Instructions     Take antibiotics as prescribed  Use nasal spray for symptomatic treatment  Stay hydrated with lots of water/fluids  Follow-up with PCP in the next few days for reexamination and to ensure resolution of symptoms  Go to the ED if any intractable fevers, unable to stay hydrated, abdominal pain, chest pain, shortness of breath, new or worsening symptoms or other concerning symptoms  Chief Complaint     Chief Complaint   Patient presents with    Cough     nasal congestion since last thursday         History of Present Illness       40 y/o female presents with mild intermittent productive cough, nasal congestion and sore throat x 6 days  Has tried Delsum and advil with minimal relief  Denies fevers, chills, body aches, chest pain, shortness of breath, GI/ symptoms or other complaints  Sick contacts with URI  States when she gets like this she typically gets on antibiotic which helped  States she has had the Zpacks with no problem        Review of Systems   Review of Systems   Constitutional: Negative for chills and fever  HENT: Positive for congestion, rhinorrhea and sore throat  Negative for ear pain, facial swelling, sinus pain, sinus pressure, trouble swallowing and voice change  Eyes: Negative for discharge, itching and visual disturbance  Respiratory: Positive for cough  Negative for chest tightness, shortness of breath and wheezing  Cardiovascular: Negative for chest pain  Gastrointestinal: Negative for abdominal pain, diarrhea, nausea and vomiting  Musculoskeletal: Negative for back pain and neck pain  Skin: Negative for rash  Neurological: Negative for dizziness, syncope, weakness, numbness and headaches  All other systems reviewed and are negative  Current Medications       Current Outpatient Prescriptions:     folic acid (FOLVITE) 1 mg tablet, Take 1 tablet (1 mg total) by mouth daily, Disp: 90 tablet, Rfl: 3    azithromycin (ZITHROMAX) 250 mg tablet, Take 2 tablets today then 1 tablet daily x 4 days, Disp: 6 tablet, Rfl: 0    Current Allergies     Allergies as of 01/07/2019 - Reviewed 01/07/2019   Allergen Reaction Noted    Penicillins Anaphylaxis and Hives 01/04/2013    Cephalexin  01/04/2013            The following portions of the patient's history were reviewed and updated as appropriate: allergies, current medications, past family history, past medical history, past social history, past surgical history and problem list      Past Medical History:   Diagnosis Date    Allergic     Anemia     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 ([address] )     Mitral valve prolapse        Past Surgical History:   Procedure Laterality Date    DENTAL SURGERY      US GUIDED BREAST BIOPSY LEFT COMPLETE Left 7/31/2017       Family History   Problem Relation Age of Onset    Hypertension Family     Gout Family     Coronary artery disease Family     Hereditary spherocytosis Family          Medications have been verified  Objective   /56   Pulse 73   Temp 98 6 °F (37 °C) (Temporal)   Resp 20   Wt 59 kg (130 lb)   LMP 12/20/2018 (Approximate)   SpO2 100%   BMI 20 98 kg/m²        Physical Exam     Physical Exam   Constitutional: She is oriented to person, place, and time  She appears well-developed and well-nourished  No distress  HENT:   Head: Normocephalic and atraumatic  Right Ear: Hearing, tympanic membrane, external ear and ear canal normal  No mastoid tenderness  Left Ear: Hearing, tympanic membrane, external ear and ear canal normal  No mastoid tenderness     Mouth/Throat: Uvula is midline and mucous membranes are normal  No uvula swelling  Posterior oropharyngeal erythema present  Mild postnasal drip   Eyes: Pupils are equal, round, and reactive to light  Neck: Normal range of motion  Neck supple  Cardiovascular: Normal rate, regular rhythm and normal heart sounds  Pulmonary/Chest: Effort normal and breath sounds normal  No respiratory distress  She has no wheezes  Musculoskeletal: Normal range of motion  Neurological: She is alert and oriented to person, place, and time  Skin: Skin is warm and dry  No rash noted  Psychiatric: She has a normal mood and affect  Her behavior is normal    Nursing note and vitals reviewed

## 2019-01-09 ENCOUNTER — TELEPHONE (OUTPATIENT)
Dept: INTERNAL MEDICINE CLINIC | Facility: CLINIC | Age: 44
End: 2019-01-09

## 2019-01-09 NOTE — TELEPHONE ENCOUNTER
Schedule her tomorrow with Dr Figueroa-in addition we should be transitioning her to him as her new primary care physician-explain the office situation to her please

## 2019-01-09 NOTE — TELEPHONE ENCOUNTER
Pt went to [facility] on Monday for a cough, no voice, and sore throat and was prescribed a Z-pack, then on Tuesday she woke up with pink eye  Took some drops that she had at home leftover from when her daughter had pink eye and that seems to be clearing up this morning but the cold symptoms don't seem to be getting better  Please advise

## 2019-01-10 ENCOUNTER — OFFICE VISIT (OUTPATIENT)
Dept: INTERNAL MEDICINE CLINIC | Facility: CLINIC | Age: 44
End: 2019-01-10
Payer: COMMERCIAL

## 2019-01-10 VITALS — TEMPERATURE: 97.5 F | HEART RATE: 80 BPM | DIASTOLIC BLOOD PRESSURE: 60 MMHG | SYSTOLIC BLOOD PRESSURE: 118 MMHG

## 2019-01-10 DIAGNOSIS — J06.9 VIRAL UPPER RESPIRATORY TRACT INFECTION: Primary | ICD-10-CM

## 2019-01-10 PROCEDURE — 99213 OFFICE O/P EST LOW 20 MIN: CPT | Performed by: INTERNAL MEDICINE

## 2019-01-10 RX ORDER — BENZONATATE 200 MG/1
200 CAPSULE ORAL 3 TIMES DAILY PRN
Qty: 45 CAPSULE | Refills: 0 | Status: SHIPPED | OUTPATIENT
Start: 2019-01-10 | End: 2019-01-25

## 2019-01-10 NOTE — LETTER
January 10, 2019     Patient: Brady Bryant   YOB: 1975   Date of Visit: 1/10/2019       To Whom it May Concern:    Abdi Robertson is under my professional care  She was seen in my office on 1/10/2019  She may return to work on Monday 1/14/19 after she recovers from her infection  If you have any questions or concerns, please don't hesitate to call           Sincerely,          Cristina Elkins DO        CC: No Recipients

## 2019-01-10 NOTE — PROGRESS NOTES
Assessment/Plan:    #Viral URI  -reports 10 days of sore throat, raspy voice, pink eye that started on Tuesday along with night sweats, chills, clammy feeling  -states that she has been taking Delsym for the past week and a half along with Tylenol which is helping  -on Monday symptoms did not improve the patient went to urgent care and was given a Z-Abran which she has taken for 3 days without any relief or improvement  -vitals stable today blood pressure 118/60 with pulse of 80 and temperature 97 5°  -patient reports that she did not receive the flu shot this year and never gets it, we encouraged her to obtain this vaccination as soon as she recovers from her current illness  -will start on Tessalon Perles for cough relief and encouraged patient to continue with supportive care for the time being  -patient will call back within 2 weeks if symptoms worsen or do not improve    Addendum 4/16/19 patient underwent RUQ US due to nausea  Right upper renal pole 3mm echogenic non-shadowing focus 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 and needs a followup US at this time  Will need to consider at the next visit  For comprehensive progress no refer to 10/30/2018    No problem-specific Assessment & Plan notes found for this encounter  Diagnoses and all orders for this visit:    Viral upper respiratory tract infection  -     benzonatate (TESSALON) 200 MG capsule;  Take 1 capsule (200 mg total) by mouth 3 (three) times a day as needed for cough for up to 15 days            Current Outpatient Prescriptions:     azithromycin (ZITHROMAX) 250 mg tablet, Take 2 tablets today then 1 tablet daily x 4 days, Disp: 6 tablet, Rfl: 0    benzonatate (TESSALON) 200 MG capsule, Take 1 capsule (200 mg total) by mouth 3 (three) times a day as needed for cough for up to 15 days, Disp: 45 capsule, Rfl: 0    folic acid (FOLVITE) 1 mg tablet, Take 1 tablet (1 mg total) by mouth daily, Disp: 90 tablet, Rfl: 3    Subjective:      Patient ID: Abilio Andrew Deena Blair is a 39 y o  female  HPI     Patient presents as an acute visit  Reports that for the past 10 days she has been experiencing a sore throat, raspy voice that progressed to pink eye on Tuesday  She states that she feels chills cold and clammy along with occasional night sweats  Patient states that she originally lost her voice and having cough a dry cough however those symptoms have resolved  She states that she is not wheezing, short of breath, nausea, vomiting, diarrhea  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, Tuesday, 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  She states that she has been taking Delsym for past week and a half which is helping with her symptoms  She went to [facility] on Monday and was given a Z-Abran  Patient states that she took approximately 3 doses of the Z-Abran and did not take another dose today because he was not making any difference  We informed her that her likely illness at this time is viral and that she should continue with Delsym, Tylenol, Tessalon Perles which we will prescribe for cough relief  Informed patient that she should get the flu shot whenever she recovers from this illness  Patient will call back within 2 weeks if symptoms worsen or do not improve  The following portions of the patient's history were reviewed and updated as appropriate: allergies, current medications, past family history, past medical history, past social history, past surgical history and problem list     Review of Systems   Constitutional: Positive for chills and fever  Negative for activity change, appetite change and fatigue  HENT: Positive for congestion and sore throat  Negative for ear pain, facial swelling, hearing loss, tinnitus and trouble swallowing  Eyes: Positive for discharge (left eye clear discharge)  Negative for photophobia and visual disturbance  Respiratory: Positive for cough  Negative for shortness of breath and wheezing  Cardiovascular: Negative for chest pain and leg swelling  Gastrointestinal: Negative for abdominal distention, abdominal pain, blood in stool, nausea and vomiting  Endocrine: Negative for cold intolerance and heat intolerance  Genitourinary: Negative for difficulty urinating, dysuria and pelvic pain  Musculoskeletal: Positive for back pain (chronic lumbar)  Negative for arthralgias, gait problem, joint swelling, myalgias, neck pain and neck stiffness  Skin: Negative for rash and wound  Neurological: Negative for dizziness, tremors, light-headedness and headaches  Objective:      /60   Pulse 80   Temp 97 5 °F (36 4 °C) (Oral)   LMP 12/20/2018 (Approximate)          Physical Exam   Constitutional: She is oriented to person, place, and time  She appears well-developed and well-nourished  HENT:   Head: Normocephalic and atraumatic  Right Ear: External ear normal    Left Ear: External ear normal    Nose: Nose normal    Mouth/Throat: Oropharynx is clear and moist    rhinorrhea   Eyes: Pupils are equal, round, and reactive to light  Conjunctivae and EOM are normal    Neck: Normal range of motion  Neck supple  No JVD present  No thyromegaly present  Cardiovascular: Normal rate, regular rhythm, normal heart sounds and intact distal pulses  No murmur heard  Pulmonary/Chest: Effort normal and breath sounds normal  No stridor  No respiratory distress  She has no wheezes  Abdominal: Soft  Bowel sounds are normal  She exhibits no distension  There is no tenderness  There is no rebound and no guarding  Musculoskeletal: Normal range of motion  She exhibits no edema or tenderness  Lymphadenopathy:     She has no cervical adenopathy  Neurological: She is alert and oriented to person, place, and time  She has normal reflexes  Skin: Skin is warm  No rash noted  No erythema  Vitals reviewed

## 2019-03-11 ENCOUNTER — OFFICE VISIT (OUTPATIENT)
Dept: OBGYN CLINIC | Facility: CLINIC | Age: 44
End: 2019-03-11
Payer: COMMERCIAL

## 2019-03-11 VITALS
SYSTOLIC BLOOD PRESSURE: 120 MMHG | DIASTOLIC BLOOD PRESSURE: 82 MMHG | WEIGHT: 136.4 LBS | BODY MASS INDEX: 21.92 KG/M2 | HEIGHT: 66 IN

## 2019-03-11 DIAGNOSIS — Z30.011 ENCOUNTER FOR INITIAL PRESCRIPTION OF CONTRACEPTIVE PILLS: Primary | ICD-10-CM

## 2019-03-11 PROCEDURE — 99214 OFFICE O/P EST MOD 30 MIN: CPT | Performed by: OBSTETRICS & GYNECOLOGY

## 2019-03-11 RX ORDER — NORGESTIMATE AND ETHINYL ESTRADIOL 0.25-0.035
1 KIT ORAL DAILY
Qty: 84 TABLET | Refills: 3 | Status: SHIPPED | OUTPATIENT
Start: 2019-03-11 | End: 2020-02-19 | Stop reason: ALTCHOICE

## 2019-03-11 NOTE — PATIENT INSTRUCTIONS
Patient in need of contraception, complaining of increasing PMS symptomatology bloating sensation cage in and dysmenorrhea  Will put on a birth control pill  Return to office in 2 months for discussion of the symptomatology and see if any side effects occur  Instructions were given

## 2019-03-11 NOTE — PROGRESS NOTES
HPI    this is a 79-year-old white female, she is a  2 para 2 with 2 prior vaginal deliveries  Her current method of contraception includes withdrawal   She is now complaining of just not feeling white with lytes have formal contraception  She also states she has not feel like herself with increasing PMS, abdominal bloating, mood swings, and occasional dysmenorrhea  There is no problem with intimacy  After reviewing the symptoms with that we try a short course of the birth control pill to put her ovaries at rest   She has no contraindication to birth control pill  She is not a smoker  She has no history of blood clots  Her BMI is 22  She was reminded to get HIDA take the pills same time a day  She was reminded she must use a backup method the 1st month  We will see her back here in 2 months for re-evaluation  She was reminded any problem headaches blurred vision chest pain to call us as soon as possible

## 2019-04-30 ENCOUNTER — OFFICE VISIT (OUTPATIENT)
Dept: INTERNAL MEDICINE CLINIC | Facility: CLINIC | Age: 44
End: 2019-04-30
Payer: COMMERCIAL

## 2019-04-30 VITALS
DIASTOLIC BLOOD PRESSURE: 78 MMHG | HEART RATE: 88 BPM | SYSTOLIC BLOOD PRESSURE: 118 MMHG | WEIGHT: 134 LBS | HEIGHT: 66 IN | OXYGEN SATURATION: 98 % | RESPIRATION RATE: 14 BRPM | BODY MASS INDEX: 21.53 KG/M2

## 2019-04-30 DIAGNOSIS — D17.9 ANGIOMYOLIPOMA: Primary | ICD-10-CM

## 2019-04-30 DIAGNOSIS — E55.9 VITAMIN D DEFICIENCY: ICD-10-CM

## 2019-04-30 DIAGNOSIS — D58.0 HEREDITARY SPHEROCYTOSIS (HCC): ICD-10-CM

## 2019-04-30 DIAGNOSIS — Z13.220 SCREENING FOR HYPERLIPIDEMIA: ICD-10-CM

## 2019-04-30 DIAGNOSIS — Z13.1 SCREENING FOR DIABETES MELLITUS: ICD-10-CM

## 2019-04-30 DIAGNOSIS — Z13.29 SCREENING FOR THYROID DISORDER: ICD-10-CM

## 2019-04-30 PROCEDURE — 1036F TOBACCO NON-USER: CPT | Performed by: INTERNAL MEDICINE

## 2019-04-30 PROCEDURE — 3008F BODY MASS INDEX DOCD: CPT | Performed by: INTERNAL MEDICINE

## 2019-04-30 PROCEDURE — 99214 OFFICE O/P EST MOD 30 MIN: CPT | Performed by: INTERNAL MEDICINE

## 2019-06-17 ENCOUNTER — HOSPITAL ENCOUNTER (OUTPATIENT)
Dept: RADIOLOGY | Age: 44
Discharge: HOME | End: 2019-06-17
Payer: COMMERCIAL

## 2019-06-17 DIAGNOSIS — D17.9 ANGIOMYOLIPOMA: ICD-10-CM

## 2019-06-17 PROCEDURE — 76770 US EXAM ABDO BACK WALL COMP: CPT

## 2019-09-25 ENCOUNTER — ANNUAL EXAM (OUTPATIENT)
Dept: OBGYN CLINIC | Facility: CLINIC | Age: 44
End: 2019-09-25
Payer: COMMERCIAL

## 2019-09-25 VITALS
HEIGHT: 66 IN | SYSTOLIC BLOOD PRESSURE: 100 MMHG | BODY MASS INDEX: 21.63 KG/M2 | WEIGHT: 134.6 LBS | DIASTOLIC BLOOD PRESSURE: 70 MMHG

## 2019-09-25 DIAGNOSIS — Z01.419 WOMEN'S ANNUAL ROUTINE GYNECOLOGICAL EXAMINATION: ICD-10-CM

## 2019-09-25 DIAGNOSIS — Z12.39 BREAST CANCER SCREENING: ICD-10-CM

## 2019-09-25 DIAGNOSIS — Z30.011 ENCOUNTER FOR INITIAL PRESCRIPTION OF CONTRACEPTIVE PILLS: Primary | ICD-10-CM

## 2019-09-25 PROCEDURE — S0612 ANNUAL GYNECOLOGICAL EXAMINA: HCPCS | Performed by: OBSTETRICS & GYNECOLOGY

## 2019-09-25 RX ORDER — NORETHINDRONE ACETATE AND ETHINYL ESTRADIOL 1; .02 MG/1; MG/1
1 TABLET ORAL DAILY
Qty: 84 TABLET | Refills: 3 | Status: SHIPPED | OUTPATIENT
Start: 2019-09-25 | End: 2020-09-09 | Stop reason: SDUPTHER

## 2019-09-25 NOTE — PATIENT INSTRUCTIONS
The patient was informed of a stable gyn examination  A Pap smear was performed  Now switch to the birth control pill of her choice for contraception and control of PMS symptomatology she will keep me informed of her progress and call me in 2 months

## 2019-09-25 NOTE — PROGRESS NOTES
Assessment/Plan:    The patient was informed of a stable gyn examination  A Pap smear was performed  She will make arrangements for mammogram   Now start her on the birth control pill of her choice to see if we can 1  Year contraception that she is happy with the N 2  Help control the PMS symptomatology  She will keep me informed of her progress  She return to office on a p r n  Basis  Subjective:      Patient ID: Queenie Davison is a 38 y o  female  HPI  This is a 78-year-old white female, she is a  2 para 2 with 2 prior vaginal deliveries  Her current method of contraception includes withdrawal and condoms  We had tried a short course of birth control pills earlier  She denied a particular pill  She is now as request for junel/20  There are no contraindications for birth control pills for her willing to try this  She is not happy with her weight  There is no problem with intimacy  She has a dentist on a regular basis  Her mammogram is been scheduled  There are no new major family illnesses report this time  Upon further questions she also likes the birth control pill because she has some slight PMS prior to onset of her menses  The following portions of the patient's history were reviewed and updated as appropriate: allergies, current medications, past family history, past medical history, past social history, past surgical history and problem list     Review of Systems   Psychiatric/Behavioral:        Positive for PMS   All other systems reviewed and are negative  Objective:      /70   Ht 5' 6" (1 676 m)   Wt 61 1 kg (134 lb 9 6 oz)   LMP 2019 (Exact Date)   BMI 21 73 kg/m²          Physical Exam   Constitutional: She is oriented to person, place, and time  She appears well-developed and well-nourished  HENT:   Head: Normocephalic and atraumatic  Eyes: EOM are normal    Neck: Normal range of motion  Neck supple  No thyromegaly present  Cardiovascular: Normal rate and regular rhythm  Pulmonary/Chest: Breath sounds normal  No stridor  No respiratory distress  She has no rales  She exhibits no tenderness  Right breast exhibits no inverted nipple, no mass, no nipple discharge, no skin change and no tenderness  Left breast exhibits no inverted nipple, no mass, no nipple discharge, no skin change and no tenderness  No breast swelling, tenderness, discharge or bleeding  Breasts are symmetrical    Abdominal: Soft  Bowel sounds are normal  She exhibits no distension and no mass  There is no tenderness  There is no rebound and no guarding  No hernia  Hernia confirmed negative in the right inguinal area and confirmed negative in the left inguinal area  Genitourinary: Rectum normal, vagina normal and uterus normal  Pelvic exam was performed with patient prone  No labial fusion  There is no rash, tenderness, lesion or injury on the right labia  There is no rash, tenderness, lesion or injury on the left labia  Uterus is not deviated, not enlarged, not fixed and not tender  Cervix exhibits no motion tenderness, no discharge and no friability  Right adnexum displays no mass, no tenderness and no fullness  Left adnexum displays no mass, no tenderness and no fullness  No erythema, tenderness or bleeding in the vagina  No foreign body in the vagina  No signs of injury around the vagina  No vaginal discharge found  Genitourinary Comments: A Pap smear was performed   Musculoskeletal: Normal range of motion  Lymphadenopathy: No inguinal adenopathy noted on the right or left side  Neurological: She is alert and oriented to person, place, and time  Skin: Skin is warm and dry  Psychiatric: She has a normal mood and affect   Her behavior is normal    The patient does admit to premenstrual syndrome symptoms

## 2019-09-29 LAB
CLINICAL INFO: NORMAL
CYTO CVX: NORMAL
CYTOLOGY CMNT CVX/VAG CYTO-IMP: NORMAL
DATE PREVIOUS BX: NORMAL
HPV E6+E7 MRNA CVX QL NAA+PROBE: NOT DETECTED
LMP START DATE: NORMAL
SL AMB PREV. PAP:: NORMAL
SPECIMEN SOURCE CVX/VAG CYTO: NORMAL

## 2020-01-24 ENCOUNTER — TRANSCRIBE ORDERS (OUTPATIENT)
Dept: ADMINISTRATIVE | Facility: HOSPITAL | Age: 45
End: 2020-01-24

## 2020-01-24 DIAGNOSIS — Z09 FOLLOW UP: Primary | ICD-10-CM

## 2020-02-03 ENCOUNTER — OFFICE VISIT (OUTPATIENT)
Dept: INTERNAL MEDICINE CLINIC | Facility: CLINIC | Age: 45
End: 2020-02-03
Payer: COMMERCIAL

## 2020-02-03 VITALS
RESPIRATION RATE: 15 BRPM | DIASTOLIC BLOOD PRESSURE: 72 MMHG | HEART RATE: 81 BPM | OXYGEN SATURATION: 99 % | SYSTOLIC BLOOD PRESSURE: 114 MMHG

## 2020-02-03 DIAGNOSIS — I34.1 MITRAL VALVE PROLAPSE: Primary | ICD-10-CM

## 2020-02-03 PROCEDURE — 1036F TOBACCO NON-USER: CPT | Performed by: INTERNAL MEDICINE

## 2020-02-03 PROCEDURE — 99213 OFFICE O/P EST LOW 20 MIN: CPT | Performed by: INTERNAL MEDICINE

## 2020-02-03 PROCEDURE — 93000 ELECTROCARDIOGRAM COMPLETE: CPT | Performed by: INTERNAL MEDICINE

## 2020-02-03 NOTE — PROGRESS NOTES
Assessment/Plan:    #MVP  -flutters in chest with chest tightness for several months but worse yesterday  -reports history of and 2016 ECHO confirms this  -will repeat ECHO  -start on metoprolol as needed for symptoms  -EKG NSR rate 70 today   -refer to cardiology    Addendum 02/13/2020 2D echo reveals EF 60%, mild late systolic prolapse of mitral valve with mild regurgitation and mild regurgitation of tricuspid valve and mild regurgitation pulmonic valve  Addendum 02/20/2020 cardiology will order stress test    For comprehensive progress note refer to 4/30/19    No problem-specific Assessment & Plan notes found for this encounter  Diagnoses and all orders for this visit:    Mitral valve prolapse  -     Echo complete with contrast if indicated; Future  -     Ambulatory referral to Cardiology; Future  -     metoprolol tartrate (LOPRESSOR) 25 mg tablet; Take 0 5 tablets (12 5 mg total) by mouth 2 (two) times a day as needed (palpitations)  -     POCT ECG            Current Outpatient Medications:     folic acid (FOLVITE) 1 mg tablet, Take 1 tablet (1 mg total) by mouth daily (Patient not taking: Reported on 9/25/2019), Disp: 90 tablet, Rfl: 3    metoprolol tartrate (LOPRESSOR) 25 mg tablet, Take 0 5 tablets (12 5 mg total) by mouth 2 (two) times a day as needed (palpitations), Disp: 30 tablet, Rfl: 0    norethindrone-ethinyl estradiol (MICROGESTIN 1/20) 1-20 MG-MCG per tablet, Take 1 tablet by mouth daily, Disp: 84 tablet, Rfl: 3    norgestimate-ethinyl estradiol (ORTHO-CYCLEN) 0 25-35 MG-MCG per tablet, Take 1 tablet by mouth daily (Patient not taking: Reported on 9/25/2019), Disp: 84 tablet, Rfl: 3    Subjective:      Patient ID: Juliano Ricci is a 38 y o  female  HPI    Presents for an acute visit  Reports heart palpitations with chest tightness for several months  Reports that she thought it may have been due to her birth control however symptoms worse yesterday   She reports that she had an episode of nausea and vomiting with chest tightness  Denies fever, headache, dizziness, swelling in the legs  She has a history of MVP and we will repeat ECHO  EKG today reveals NSR rate 70  Will start on metoprolol as needed and refer to cardiology  The following portions of the patient's history were reviewed and updated as appropriate: allergies, current medications, past family history, past medical history, past social history, past surgical history and problem list     Review of Systems   Constitutional: Negative for activity change, appetite change, chills, fatigue and fever  HENT: Negative for congestion, ear pain, facial swelling, hearing loss, sore throat, tinnitus and trouble swallowing  Eyes: Negative for photophobia and visual disturbance  Respiratory: Negative for cough, shortness of breath and wheezing  Cardiovascular: Positive for chest pain and palpitations  Negative for leg swelling  Gastrointestinal: Positive for abdominal pain (epigastric) and nausea  Negative for abdominal distention, blood in stool, constipation, diarrhea and vomiting  Genitourinary: Negative for difficulty urinating, dysuria and pelvic pain  Musculoskeletal: Negative for arthralgias, back pain, gait problem, joint swelling, myalgias, neck pain and neck stiffness  Skin: Negative for rash and wound  Neurological: Negative for dizziness, tremors, light-headedness and headaches  Objective:      /72   Pulse 81   Resp 15   SpO2 99%          Physical Exam   Constitutional: She is oriented to person, place, and time  She appears well-developed and well-nourished  Non-toxic appearance  She does not appear ill  HENT:   Head: Normocephalic and atraumatic  Right Ear: External ear normal    Left Ear: External ear normal    Nose: Nose normal    Mouth/Throat: Oropharynx is clear and moist    Eyes: Pupils are equal, round, and reactive to light   Conjunctivae and EOM are normal    Neck: Normal range of motion  Neck supple  No JVD present  No thyromegaly present  Cardiovascular: Normal rate, regular rhythm, normal heart sounds, intact distal pulses and normal pulses  Exam reveals no gallop, no S3, no S4, no distant heart sounds and no friction rub  No murmur heard  No systolic murmur is present  No diastolic murmur is present  Pulmonary/Chest: Effort normal and breath sounds normal  No stridor  No respiratory distress  She has no wheezes  Abdominal: Soft  Bowel sounds are normal  She exhibits no distension  There is splenomegaly  There is tenderness (epigastric)  There is no rebound and no guarding  Musculoskeletal: Normal range of motion  She exhibits no edema or tenderness  Right lower leg: Normal  She exhibits no edema  Left lower leg: Normal  She exhibits no edema  Lymphadenopathy:     She has no cervical adenopathy  Neurological: She is alert and oriented to person, place, and time  She has normal reflexes  Skin: Skin is warm and dry  No rash noted  No erythema  Vitals reviewed

## 2020-02-13 ENCOUNTER — HOSPITAL ENCOUNTER (OUTPATIENT)
Dept: NON INVASIVE DIAGNOSTICS | Facility: CLINIC | Age: 45
Discharge: HOME | End: 2020-02-13
Payer: COMMERCIAL

## 2020-02-13 DIAGNOSIS — I34.1 MITRAL VALVE PROLAPSE: ICD-10-CM

## 2020-02-13 PROCEDURE — 93306 TTE W/DOPPLER COMPLETE: CPT | Performed by: INTERNAL MEDICINE

## 2020-02-13 PROCEDURE — 93306 TTE W/DOPPLER COMPLETE: CPT

## 2020-02-19 ENCOUNTER — CONSULT (OUTPATIENT)
Dept: CARDIOLOGY CLINIC | Facility: CLINIC | Age: 45
End: 2020-02-19
Payer: COMMERCIAL

## 2020-02-19 VITALS
OXYGEN SATURATION: 99 % | SYSTOLIC BLOOD PRESSURE: 116 MMHG | BODY MASS INDEX: 22.5 KG/M2 | WEIGHT: 140 LBS | HEIGHT: 66 IN | HEART RATE: 61 BPM | DIASTOLIC BLOOD PRESSURE: 74 MMHG

## 2020-02-19 DIAGNOSIS — R07.9 CHEST PAIN, UNSPECIFIED TYPE: Primary | ICD-10-CM

## 2020-02-19 DIAGNOSIS — I34.1 MITRAL VALVE PROLAPSE: ICD-10-CM

## 2020-02-19 PROCEDURE — 3008F BODY MASS INDEX DOCD: CPT | Performed by: INTERNAL MEDICINE

## 2020-02-19 PROCEDURE — 99243 OFF/OP CNSLTJ NEW/EST LOW 30: CPT | Performed by: INTERNAL MEDICINE

## 2020-02-19 PROCEDURE — 1036F TOBACCO NON-USER: CPT | Performed by: INTERNAL MEDICINE

## 2020-02-19 NOTE — PROGRESS NOTES
Cardiology Consultation     Page Barrow  [id number]  1975  HEART & VASCULAR [facility] [place] [id number] [facility] [id number]      Diagnoses and all orders for this visit:    Chest pain, unspecified type  -     Stress test only, exercise; Future    Mitral valve prolapse  -     Ambulatory referral to Cardiology    I had the pleasure of seeing Page Barrow for a consultation regarding MVP, chest pains requested by Dr Garcia    History of the Presenting Illness, Discussion/Summary and my Plan are as follows:::    Addie Prater is a pleasant 39-year-old without a history of hypertension, diabetes or dyslipidemia  She is essentially a lifelong nonsmoker, has less than 1 alcoholic drink a day  She also does not have a family history of premature vascular disease in any first-degree relatives  She has been diagnosed with mitral valve prolapse, most recent echo was February 2020, prior to that was 2016, reviewed both echocardiograms that show very mild posterior leaflet prolapse with mild mitral regurgitation  She is physically very active, runs 20-30 miles per week, in addition to other exercises, without any change in physical capacity  She presents mainly for further evaluation of chest pressure-has had 2 episodes-1 in November 2019, the 2nd about 3 weeks ago, there were both associated with palpitations, felt as an almost continuous 24/7 chest pressure in the left lateral chest, when she felt stressed out at the 1st episode and while having a GI illness during the 2nd episode when her symptoms actually improved once she threw up  No associated shortness of breath or sweating  Sometimes she has noticed that she feels a vague discomfort when she exercises but has had no limitation in physical capacity  Cardiac physical exam is unremarkable except for a soft systolic murmur of mitral regurgitation    Lipids are also under control  Recent ECG showed normal sinus rhythm  Plan:     Chest discomfort:  Unlikely to be cardiac, does not have a single risk factor, however will perform an exercise treadmill stress test     Palpitations:  Rare, has not had any palpitations in 3 weeks, if she has any further palpitations, can check a Holter monitor  At this time will hold off  No recent TSH but palpitations have resolved  Mitral valve prolapse:  Associated with mild MR, I reassured her that it is unlikely to progress any time soon, if at all  Can recheck in 3 years  Or if symptomatic    Encouraged her to continue living her healthy lifestyle  She would like to follow up as needed  Results for Addison Self (MRN [id number]) as of 2/19/2020 17:55   Ref  Range 3/4/2015 06:56 8/21/2016 08:31   Cholesterol Latest Ref Range: 50 - 200 mg/dL 134 141   Triglycerides Latest Ref Range: <=150 mg/dL 57 34   HDL Latest Ref Range: 40 - 60 mg/dL 65 72 (H)   LDL Direct Latest Ref Range: 0 - 100 mg/dL 58    LDL CHOLESTEROL DIRECT Latest Ref Range: 0 - 100 mg/dl 66 64   Results for Addison Self (MRN [id number]) as of 2/19/2020 17:55   Ref  Range 8/21/2016 08:31   Hemoglobin A1C Latest Ref Range: 4 2 - 6 3 % 4 0 (L)         1  Chest pain, unspecified type  Stress test only, exercise   2   Mitral valve prolapse  Ambulatory referral to Cardiology     Patient Active Problem List   Diagnosis    Allergic reaction    Hereditary spherocytosis ([address] )    Ventral hernia without obstruction or gangrene    Colicky RUQ abdominal pain    Elevated bilirubin    Abdominal pain    Renal angiomyolipoma    Chest pain    Mitral valve prolapse     Past Medical History:   Diagnosis Date    Allergic     Anemia     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 ([address] )     Mitral valve prolapse      Social History     Socioeconomic History    Marital status: /Civil Union     Spouse name: Not on file    Number of children: Not on file    Years of education: Not on file    Highest education level: Not on file   Occupational History    Not on file   Social Needs    Financial resource strain: Not on file    Food insecurity:     Worry: Not on file     Inability: Not on file    Transportation needs:     Medical: Not on file     Non-medical: Not on file   Tobacco Use    Smoking status: Former Smoker    Smokeless tobacco: Never Used   Substance and Sexual Activity    Alcohol use:  Yes     Alcohol/week: 2 0 standard drinks     Types: 2 Glasses of wine per week    Drug use: No    Sexual activity: Yes     Partners: Male     Birth control/protection: None   Lifestyle    Physical activity:     Days per week: Not on file     Minutes per session: Not on file    Stress: Not on file   Relationships    Social connections:     Talks on phone: Not on file     Gets together: Not on file     Attends Qu[employer] service: Not on file     Active member of club or organization: Not on file     Attends meetings of clubs or organizations: Not on file     Relationship status: Not on file    Intimate partner violence:     Fear of current or ex partner: Not on file     Emotionally abused: Not on file     Physically abused: Not on file     Forced sexual activity: Not on file   Other Topics Concern    Not on file   Social History Narrative    Not on file      Family History   Problem Relation Age of Onset    Hypertension Family     Gout Family     Coronary artery disease Family     Hereditary spherocytosis Family      Past Surgical History:   Procedure Laterality Date    DENTAL SURGERY      US GUIDED BREAST BIOPSY LEFT COMPLETE Left 7/31/2017       Current Outpatient Medications:     folic acid (FOLVITE) 1 mg tablet, Take 1 tablet (1 mg total) by mouth daily, Disp: 90 tablet, Rfl: 3    norethindrone-ethinyl estradiol (MICROGESTIN 1/20) 1-20 MG-MCG per tablet, Take 1 tablet by mouth daily, Disp: 84 tablet, Rfl: 3    metoprolol tartrate (LOPRESSOR) 25 mg tablet, Take 0 5 tablets (12 5 mg total) by mouth 2 (two) times a day as needed (palpitations), Disp: 30 tablet, Rfl: 0  Allergies   Allergen Reactions    Penicillins Anaphylaxis and Hives    Cephalexin      Vitals:    02/19/20 1739   BP: 116/74   BP Location: Right arm   Patient Position: Sitting   Cuff Size: Standard   Pulse: 61   SpO2: 99%   Weight: 63 5 kg (140 lb)   Height: 5' 6" (1 676 m)         Imaging: No results found  Review of Systems:  Review of Systems   Constitutional: Negative  HENT: Negative  Eyes: Negative  Respiratory: Negative  Cardiovascular: Negative  Endocrine: Negative  Musculoskeletal: Negative  Neurological: Negative  Physical Exam:    /74 (BP Location: Right arm, Patient Position: Sitting, Cuff Size: Standard)   Pulse 61   Ht 5' 6" (1 676 m)   Wt 63 5 kg (140 lb)   SpO2 99%   BMI 22 60 kg/m²   Physical Exam   Constitutional: She appears well-developed and well-nourished  No distress  HENT:   Head: Normocephalic and atraumatic  Eyes: Pupils are equal, round, and reactive to light  Conjunctivae are normal  Right eye exhibits no discharge  Left eye exhibits no discharge  Neck: Normal range of motion  No tracheal deviation present  No thyromegaly present  Cardiovascular: Normal rate and regular rhythm  Exam reveals no friction rub  No murmur heard  Pulmonary/Chest: Effort normal and breath sounds normal  No stridor  No respiratory distress  She has no wheezes  Abdominal: Soft  Bowel sounds are normal  She exhibits no distension  There is no tenderness  There is no guarding  Musculoskeletal: Normal range of motion  She exhibits no edema or deformity  Skin: Skin is warm  No rash noted  She is not diaphoretic  No erythema  No pallor

## 2020-03-11 ENCOUNTER — HOSPITAL ENCOUNTER (OUTPATIENT)
Dept: NON INVASIVE DIAGNOSTICS | Facility: CLINIC | Age: 45
Discharge: HOME | End: 2020-03-11
Payer: COMMERCIAL

## 2020-03-11 DIAGNOSIS — R07.9 CHEST PAIN, UNSPECIFIED TYPE: ICD-10-CM

## 2020-03-11 LAB
CHEST PAIN STATEMENT: NORMAL
MAX DIASTOLIC BP: 88 MMHG
MAX HEART RATE: 157 BPM
MAX PREDICTED HEART RATE: 175 BPM
MAX. SYSTOLIC BP: 140 MMHG
PROTOCOL NAME: NORMAL
REASON FOR TERMINATION: NORMAL
TARGET HR FORMULA: NORMAL
TEST INDICATION: NORMAL
TIME IN EXERCISE PHASE: NORMAL

## 2020-03-11 PROCEDURE — 93017 CV STRESS TEST TRACING ONLY: CPT

## 2020-03-11 PROCEDURE — 93016 CV STRESS TEST SUPVJ ONLY: CPT | Performed by: INTERNAL MEDICINE

## 2020-03-11 PROCEDURE — 93018 CV STRESS TEST I&R ONLY: CPT | Performed by: INTERNAL MEDICINE

## 2020-03-13 ENCOUNTER — TELEPHONE (OUTPATIENT)
Dept: CARDIOLOGY CLINIC | Facility: CLINIC | Age: 45
End: 2020-03-13

## 2020-03-13 NOTE — TELEPHONE ENCOUNTER
----- Message from Lupe Opitz, MD sent at 3/11/2020  2:01 PM EDT -----     stress test was good, please let the patient know

## 2020-06-30 ENCOUNTER — HOSPITAL ENCOUNTER (OUTPATIENT)
Dept: ULTRASOUND IMAGING | Facility: CLINIC | Age: 45
Discharge: HOME | End: 2020-06-30
Payer: COMMERCIAL

## 2020-06-30 ENCOUNTER — HOSPITAL ENCOUNTER (OUTPATIENT)
Dept: MAMMOGRAPHY | Facility: CLINIC | Age: 45
Discharge: HOME | End: 2020-06-30
Payer: COMMERCIAL

## 2020-06-30 VITALS — BODY MASS INDEX: 20.57 KG/M2 | WEIGHT: 128 LBS | HEIGHT: 66 IN

## 2020-06-30 DIAGNOSIS — R92.8 ABNORMAL MAMMOGRAM: ICD-10-CM

## 2020-06-30 DIAGNOSIS — Z09 FOLLOW UP: ICD-10-CM

## 2020-06-30 DIAGNOSIS — R92.8 ABNORMAL FINDINGS ON DIAGNOSTIC IMAGING OF BREAST: ICD-10-CM

## 2020-06-30 PROCEDURE — 77066 DX MAMMO INCL CAD BI: CPT

## 2020-06-30 PROCEDURE — G0279 TOMOSYNTHESIS, MAMMO: HCPCS

## 2020-06-30 PROCEDURE — 76642 ULTRASOUND BREAST LIMITED: CPT

## 2020-09-09 DIAGNOSIS — Z30.011 ENCOUNTER FOR INITIAL PRESCRIPTION OF CONTRACEPTIVE PILLS: ICD-10-CM

## 2020-09-09 RX ORDER — NORETHINDRONE ACETATE AND ETHINYL ESTRADIOL 1; .02 MG/1; MG/1
1 TABLET ORAL DAILY
Qty: 28 TABLET | Refills: 0 | Status: SHIPPED | OUTPATIENT
Start: 2020-09-09 | End: 2020-10-05 | Stop reason: SDUPTHER

## 2020-09-09 RX ORDER — NORETHINDRONE ACETATE/ETHINYL ESTRADIOL 1MG-20MCG
KIT ORAL
Qty: 84 TABLET | Refills: 3 | OUTPATIENT
Start: 2020-09-09

## 2020-10-05 ENCOUNTER — TELEPHONE (OUTPATIENT)
Dept: OBGYN CLINIC | Facility: CLINIC | Age: 45
End: 2020-10-05

## 2020-10-05 DIAGNOSIS — N94.3 PMS (PREMENSTRUAL SYNDROME): ICD-10-CM

## 2020-10-05 DIAGNOSIS — Z30.011 ENCOUNTER FOR INITIAL PRESCRIPTION OF CONTRACEPTIVE PILLS: ICD-10-CM

## 2020-10-05 DIAGNOSIS — Z30.41 ENCOUNTER FOR SURVEILLANCE OF CONTRACEPTIVE PILLS: Primary | ICD-10-CM

## 2020-10-05 RX ORDER — NORETHINDRONE ACETATE AND ETHINYL ESTRADIOL 1MG-20(21)
1 KIT ORAL DAILY
Qty: 21 TABLET | Refills: 0 | Status: CANCELLED | OUTPATIENT
Start: 2020-10-05 | End: 2020-10-26

## 2020-10-05 RX ORDER — NORETHINDRONE ACETATE AND ETHINYL ESTRADIOL 1; .02 MG/1; MG/1
1 TABLET ORAL DAILY
Qty: 21 TABLET | Refills: 0 | Status: SHIPPED | OUTPATIENT
Start: 2020-10-05 | End: 2020-10-12 | Stop reason: SDUPTHER

## 2020-10-12 ENCOUNTER — ANNUAL EXAM (OUTPATIENT)
Dept: OBGYN CLINIC | Facility: CLINIC | Age: 45
End: 2020-10-12
Payer: COMMERCIAL

## 2020-10-12 VITALS
TEMPERATURE: 97.9 F | HEIGHT: 66 IN | DIASTOLIC BLOOD PRESSURE: 72 MMHG | SYSTOLIC BLOOD PRESSURE: 114 MMHG | WEIGHT: 138.6 LBS | BODY MASS INDEX: 22.28 KG/M2

## 2020-10-12 DIAGNOSIS — Z30.41 ENCOUNTER FOR SURVEILLANCE OF CONTRACEPTIVE PILLS: ICD-10-CM

## 2020-10-12 DIAGNOSIS — Z12.31 ENCOUNTER FOR SCREENING MAMMOGRAM FOR MALIGNANT NEOPLASM OF BREAST: ICD-10-CM

## 2020-10-12 DIAGNOSIS — Z01.419 ENCOUNTER FOR ANNUAL ROUTINE GYNECOLOGICAL EXAMINATION: Primary | ICD-10-CM

## 2020-10-12 DIAGNOSIS — N94.3 PMS (PREMENSTRUAL SYNDROME): ICD-10-CM

## 2020-10-12 PROCEDURE — S0612 ANNUAL GYNECOLOGICAL EXAMINA: HCPCS | Performed by: OBSTETRICS & GYNECOLOGY

## 2020-10-12 RX ORDER — NORETHINDRONE ACETATE AND ETHINYL ESTRADIOL 1; .02 MG/1; MG/1
1 TABLET ORAL DAILY
Qty: 21 TABLET | Refills: 12 | Status: SHIPPED | OUTPATIENT
Start: 2020-10-12 | End: 2021-12-28

## 2021-03-31 DIAGNOSIS — Z23 ENCOUNTER FOR IMMUNIZATION: ICD-10-CM

## 2021-07-19 ENCOUNTER — HOSPITAL ENCOUNTER (OUTPATIENT)
Dept: RADIOLOGY | Facility: IMAGING CENTER | Age: 46
Discharge: HOME | End: 2021-07-19
Payer: COMMERCIAL

## 2021-07-19 VITALS — BODY MASS INDEX: 20.4 KG/M2 | HEIGHT: 67 IN | WEIGHT: 130 LBS

## 2021-07-19 DIAGNOSIS — Z12.31 ENCOUNTER FOR SCREENING MAMMOGRAM FOR MALIGNANT NEOPLASM OF BREAST: ICD-10-CM

## 2021-07-19 PROCEDURE — 77063 BREAST TOMOSYNTHESIS BI: CPT

## 2021-07-19 PROCEDURE — 77067 SCR MAMMO BI INCL CAD: CPT

## 2021-12-27 ENCOUNTER — APPOINTMENT (OUTPATIENT)
Dept: LAB | Age: 46
End: 2021-12-27
Payer: COMMERCIAL

## 2021-12-27 DIAGNOSIS — E55.9 VITAMIN D DEFICIENCY: ICD-10-CM

## 2021-12-27 DIAGNOSIS — I34.1 MITRAL VALVE PROLAPSE: ICD-10-CM

## 2021-12-27 DIAGNOSIS — Z13.220 SCREENING FOR HYPERLIPIDEMIA: Primary | ICD-10-CM

## 2021-12-27 DIAGNOSIS — Z13.1 SCREENING FOR DIABETES MELLITUS: ICD-10-CM

## 2021-12-27 DIAGNOSIS — Z13.29 THYROID DISORDER SCREENING: ICD-10-CM

## 2021-12-27 LAB
CHOLEST SERPL-MCNC: 158 MG/DL
HDLC SERPL-MCNC: 77 MG/DL
LDLC SERPL CALC-MCNC: 67 MG/DL (ref 0–100)
LDLC SERPL DIRECT ASSAY-MCNC: 59 MG/DL (ref 0–100)
NONHDLC SERPL-MCNC: 81 MG/DL
TRIGL SERPL-MCNC: 71 MG/DL

## 2021-12-27 PROCEDURE — 83721 ASSAY OF BLOOD LIPOPROTEIN: CPT

## 2021-12-27 PROCEDURE — 80061 LIPID PANEL: CPT

## 2021-12-28 ENCOUNTER — OFFICE VISIT (OUTPATIENT)
Dept: INTERNAL MEDICINE CLINIC | Facility: CLINIC | Age: 46
End: 2021-12-28
Payer: COMMERCIAL

## 2021-12-28 VITALS
HEIGHT: 67 IN | WEIGHT: 131 LBS | DIASTOLIC BLOOD PRESSURE: 70 MMHG | RESPIRATION RATE: 14 BRPM | OXYGEN SATURATION: 99 % | HEART RATE: 66 BPM | SYSTOLIC BLOOD PRESSURE: 114 MMHG | BODY MASS INDEX: 20.56 KG/M2

## 2021-12-28 DIAGNOSIS — I34.1 MITRAL VALVE PROLAPSE: Primary | ICD-10-CM

## 2021-12-28 DIAGNOSIS — E78.01 FAMILIAL HYPERCHOLESTEROLEMIA: ICD-10-CM

## 2021-12-28 DIAGNOSIS — R17 ELEVATED BILIRUBIN: ICD-10-CM

## 2021-12-28 DIAGNOSIS — D58.0 HEREDITARY SPHEROCYTOSIS (HCC): ICD-10-CM

## 2021-12-28 DIAGNOSIS — D17.71 RENAL ANGIOMYOLIPOMA: ICD-10-CM

## 2021-12-28 DIAGNOSIS — E55.9 VITAMIN D DEFICIENCY: ICD-10-CM

## 2021-12-28 DIAGNOSIS — E16.2 HYPOGLYCEMIA: ICD-10-CM

## 2021-12-28 DIAGNOSIS — Z23 ENCOUNTER FOR IMMUNIZATION: ICD-10-CM

## 2021-12-28 PROCEDURE — 3725F SCREEN DEPRESSION PERFORMED: CPT | Performed by: INTERNAL MEDICINE

## 2021-12-28 PROCEDURE — 1036F TOBACCO NON-USER: CPT | Performed by: INTERNAL MEDICINE

## 2021-12-28 PROCEDURE — 99396 PREV VISIT EST AGE 40-64: CPT | Performed by: INTERNAL MEDICINE

## 2021-12-28 RX ORDER — MULTIVIT-MIN/IRON FUM/FOLIC AC 7.5 MG-4
1 TABLET ORAL DAILY
COMMUNITY

## 2021-12-28 RX ORDER — CHOLECALCIFEROL (VITAMIN D3) 50 MCG
2000 TABLET ORAL DAILY
COMMUNITY

## 2022-03-21 ENCOUNTER — TELEPHONE (OUTPATIENT)
Dept: INTERNAL MEDICINE CLINIC | Facility: CLINIC | Age: 47
End: 2022-03-21

## 2022-03-21 NOTE — TELEPHONE ENCOUNTER
PT CALLED, SAID THAT SHE GOT A BILL FOR THE LABS THAT WERE DONE LAST December BUT SHE THOUGHT IT WAS GOING TO BE DONE AS A PREVENTIVE AND NOT WITH ACTUAL DX CODES     PLEASE ADVISE

## 2022-03-21 NOTE — TELEPHONE ENCOUNTER
SPOKE WITH PT, GAVE MESSAGE   SHE IS GOING TO CHECK WITH HER INSURANCE TO SEE IF THEY CHANGED THINGS    I TOLD HER TO GIVE US A CALL BACK IF WE CAN HELP AT ALL

## 2022-04-06 ENCOUNTER — OFFICE VISIT (OUTPATIENT)
Dept: URGENT CARE | Age: 47
End: 2022-04-06
Payer: COMMERCIAL

## 2022-04-06 VITALS
HEIGHT: 67 IN | RESPIRATION RATE: 18 BRPM | OXYGEN SATURATION: 99 % | SYSTOLIC BLOOD PRESSURE: 133 MMHG | BODY MASS INDEX: 21.19 KG/M2 | DIASTOLIC BLOOD PRESSURE: 63 MMHG | WEIGHT: 135 LBS | TEMPERATURE: 97.1 F | HEART RATE: 61 BPM

## 2022-04-06 DIAGNOSIS — J06.9 ACUTE URI: Primary | ICD-10-CM

## 2022-04-06 PROCEDURE — S9083 URGENT CARE CENTER GLOBAL: HCPCS | Performed by: STUDENT IN AN ORGANIZED HEALTH CARE EDUCATION/TRAINING PROGRAM

## 2022-04-06 PROCEDURE — G0382 LEV 3 HOSP TYPE B ED VISIT: HCPCS | Performed by: STUDENT IN AN ORGANIZED HEALTH CARE EDUCATION/TRAINING PROGRAM

## 2022-04-06 RX ORDER — AZITHROMYCIN 250 MG/1
TABLET, FILM COATED ORAL
Qty: 6 TABLET | Refills: 0 | Status: SHIPPED | OUTPATIENT
Start: 2022-04-06 | End: 2022-04-10

## 2022-04-06 NOTE — PROGRESS NOTES
[facility]        NAME: Charla Ca is a 51 y o  female  : 1975    MRN: [id number]  DATE: 2022  TIME: 8:34 AM    Assessment and Plan   Acute URI [J06 9]  1  Acute URI  azithromycin (ZITHROMAX) 250 mg tablet         Patient Instructions       Follow up with PCP in 3-5 days  Proceed to  ER if symptoms worsen  Chief Complaint     Chief Complaint   Patient presents with    Nasal Congestion    Cough    Sinus Problem         History of Present Illness       HPI   Patient is fully vaccinated for COVID-19, patient presents complaining nasal congestion cough hands pain and pressure ongoing for the past few weeks   Patient does not have any fevers but states he does have occasional chills  Patient is unsure about any sick contacts      Review of Systems   Review of Systems  Per hpi     Current Medications       Current Outpatient Medications:     azithromycin (ZITHROMAX) 250 mg tablet, Take 2 tablets today then 1 tablet daily x 4 days, Disp: 6 tablet, Rfl: 0    Cholecalciferol (Vitamin D) 50 MCG (2000) tablet, Take 2,000 Units by mouth daily, Disp: , Rfl:     Multiple Vitamins-Minerals (multivitamin with minerals) tablet, Take 1 tablet by mouth daily, Disp: , Rfl:     Current Allergies     Allergies as of 2022 - Reviewed 2022   Allergen Reaction Noted    Penicillins Anaphylaxis and Hives 2013    Cephalexin  2013            The following portions of the patient's history were reviewed and updated as appropriate: allergies, current medications, past family history, past medical history, past social history, past surgical history and problem list      Past Medical History:   Diagnosis Date    Allergic     Anemia     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 ([facility] [address] )     Mitral valve prolapse        Past Surgical History:   Procedure Laterality Date    BREAST BIOPSY Left 2017    DENTAL SURGERY      US GUIDED BREAST BIOPSY LEFT COMPLETE Left 2017 Family History   Problem Relation Age of Onset    Hypertension Family     Gout Family     Coronary artery disease Family     Hereditary spherocytosis Family     No Known Problems Mother     No Known Problems Father     No Known Problems Sister     No Known Problems Daughter     No Known Problems Maternal Grandmother     No Known Problems Maternal Grandfather     No Known Problems Paternal Grandmother     No Known Problems Paternal Grandfather     No Known Problems Sister     No Known Problems Daughter     No Known Problems Paternal Aunt          Medications have been verified  Objective   /63   Pulse 61   Temp (!) 97 1 °F (36 2 °C)   Resp 18   Ht 5' 7" (1 702 m)   Wt 61 2 kg (135 lb)   LMP 03/23/2022   SpO2 99%   BMI 21 14 kg/m²   Patient's last menstrual period was 03/23/2022  Physical Exam     Physical Exam  Constitutional:       General: She is not in acute distress  Appearance: Normal appearance  HENT:      Head: Normocephalic  Nose: No congestion or rhinorrhea  Mouth/Throat:      Mouth: Mucous membranes are moist       Pharynx: Posterior oropharyngeal erythema present  No oropharyngeal exudate  Eyes:      General:         Right eye: No discharge  Left eye: No discharge  Conjunctiva/sclera: Conjunctivae normal    Cardiovascular:      Rate and Rhythm: Normal rate and regular rhythm  Pulses: Normal pulses  Pulmonary:      Effort: Pulmonary effort is normal  No respiratory distress  Abdominal:      General: Abdomen is flat  There is no distension  Palpations: Abdomen is soft  Tenderness: There is no abdominal tenderness  Musculoskeletal:      Cervical back: Neck supple  Lymphadenopathy:      Cervical: Cervical adenopathy present  Skin:     General: Skin is warm  Capillary Refill: Capillary refill takes less than 2 seconds     Neurological:      Mental Status: She is alert and oriented to person, place, and time

## 2022-07-13 ENCOUNTER — ANNUAL EXAM (OUTPATIENT)
Dept: OBGYN CLINIC | Facility: CLINIC | Age: 47
End: 2022-07-13
Payer: COMMERCIAL

## 2022-07-13 VITALS
SYSTOLIC BLOOD PRESSURE: 120 MMHG | BODY MASS INDEX: 21.03 KG/M2 | WEIGHT: 134 LBS | HEIGHT: 67 IN | DIASTOLIC BLOOD PRESSURE: 74 MMHG

## 2022-07-13 DIAGNOSIS — Z12.31 ENCOUNTER FOR SCREENING MAMMOGRAM FOR MALIGNANT NEOPLASM OF BREAST: ICD-10-CM

## 2022-07-13 DIAGNOSIS — Z01.419 ENCOUNTER FOR ANNUAL ROUTINE GYNECOLOGICAL EXAMINATION: Primary | ICD-10-CM

## 2022-07-13 PROCEDURE — S0612 ANNUAL GYNECOLOGICAL EXAMINA: HCPCS | Performed by: OBSTETRICS & GYNECOLOGY

## 2022-07-13 PROCEDURE — G0145 SCR C/V CYTO,THINLAYER,RESCR: HCPCS | Performed by: OBSTETRICS & GYNECOLOGY

## 2022-07-13 NOTE — PROGRESS NOTES
Assessment/Plan:  Pap smear done as well as annual   Encouraged self-breast examination as well as calcium supplementation  Continue annual mammogram   Reviewed aamir menopausal symptoms  She will keep a menstrual diary over the next month  She will call with update  Discussed if irregular bleeding, would then proceed with pelvic ultrasound/endometrial biopsy  She will continue to use condoms for birth control  Return to office in 1 year or p r n  No problem-specific Assessment & Plan notes found for this encounter  Diagnoses and all orders for this visit:    Encounter for annual routine gynecological examination    Encounter for screening mammogram for malignant neoplasm of breast  -     Mammo screening bilateral w 3d & cad; Future    Other orders  -     NON FORMULARY; Dim plus estrogen metabolism          Subjective:      Patient ID: Beatriz Sandra is a 49 y o  female  HPI     This is a 54-year-old female  ( x2, age 11, 5) presents for gyn exam   She was on continuous OCPs up until 2021  Her cycles were approximately every 32 days lasting 4-5 days up until  where she was 2 weeks late followed by bleeding for 3 days then restarting  to present  She also noticed coming off birth control pills her bowel movements are more regular  She denies any changes in bladder function  She has been in a monogamous relationship for over 22 years  Pap smears have been normal   She has been under lot of stress with family  She is off for the summer, teacher  She has started Dim plus for mood swings irritability with improvement    The following portions of the patient's history were reviewed and updated as appropriate: allergies, current medications, past family history, past medical history, past social history, past surgical history and problem list     Review of Systems   Constitutional: Negative for fatigue, fever and unexpected weight change     Respiratory: Negative for cough, chest tightness, shortness of breath and wheezing  Cardiovascular: Negative  Negative for chest pain and palpitations  Gastrointestinal: Negative  Negative for abdominal distention, abdominal pain, blood in stool, constipation, diarrhea, nausea and vomiting  Genitourinary: Negative  Negative for difficulty urinating, dyspareunia, dysuria, flank pain, frequency, genital sores, hematuria, pelvic pain, urgency, vaginal bleeding, vaginal discharge and vaginal pain  Skin: Negative for rash  Objective:      /74   Ht 5' 7" (1 702 m)   Wt 60 8 kg (134 lb)   LMP 07/01/2022   BMI 20 99 kg/m²          Physical Exam  Constitutional:       Appearance: Normal appearance  She is well-developed  Cardiovascular:      Rate and Rhythm: Normal rate and regular rhythm  Pulmonary:      Effort: Pulmonary effort is normal       Breath sounds: Normal breath sounds  Chest:   Breasts:      Right: No inverted nipple, mass, nipple discharge, skin change or tenderness  Left: No inverted nipple, mass, nipple discharge, skin change or tenderness  Abdominal:      General: Bowel sounds are normal  There is no distension  Palpations: Abdomen is soft  Tenderness: There is no abdominal tenderness  There is no guarding or rebound  Genitourinary:     Labia:         Right: No rash, tenderness or lesion  Left: No rash, tenderness or lesion  Vagina: Normal  No signs of injury  No vaginal discharge or tenderness  Cervix: No cervical motion tenderness, discharge, friability, lesion, erythema or cervical bleeding  Uterus: Not enlarged and not tender  Adnexa:         Right: No mass, tenderness or fullness  Left: No mass, tenderness or fullness  Comments: Patient is menstruating  Neurological:      Mental Status: She is alert and oriented to person, place, and time     Psychiatric:         Behavior: Behavior normal

## 2022-07-20 LAB
LAB AP GYN PRIMARY INTERPRETATION: NORMAL
LAB AP LMP: NORMAL
Lab: NORMAL

## 2022-07-25 ENCOUNTER — HOSPITAL ENCOUNTER (OUTPATIENT)
Dept: RADIOLOGY | Facility: IMAGING CENTER | Age: 47
Discharge: HOME | End: 2022-07-25
Payer: COMMERCIAL

## 2022-07-25 VITALS — WEIGHT: 132.06 LBS | HEIGHT: 66 IN | BODY MASS INDEX: 21.22 KG/M2

## 2022-07-25 DIAGNOSIS — Z12.31 SCREENING MAMMOGRAM FOR HIGH-RISK PATIENT: ICD-10-CM

## 2022-07-25 PROCEDURE — 77067 SCR MAMMO BI INCL CAD: CPT

## 2022-07-25 PROCEDURE — 77063 BREAST TOMOSYNTHESIS BI: CPT

## 2022-11-02 ENCOUNTER — TELEPHONE (OUTPATIENT)
Dept: OBGYN CLINIC | Facility: CLINIC | Age: 47
End: 2022-11-02

## 2022-11-02 DIAGNOSIS — Z30.011 ENCOUNTER FOR INITIAL PRESCRIPTION OF CONTRACEPTIVE PILLS: Primary | ICD-10-CM

## 2022-11-02 RX ORDER — NORETHINDRONE ACETATE AND ETHINYL ESTRADIOL 1; .02 MG/1; MG/1
1 TABLET ORAL DAILY
Qty: 84 TABLET | Refills: 1 | Status: SHIPPED | OUTPATIENT
Start: 2022-11-02

## 2022-11-02 NOTE — TELEPHONE ENCOUNTER
----- Message from Royal Ramirez DO sent at 11/2/2022 12:51 PM EDT -----  Regarding: FW: Birth Control  Patient may go back on last birth control pill, recommend office visit follow-up 3-4 months thereafter  ----- Message -----  From: Arelis Esposito RN  Sent: 11/2/2022   9:13 AM EDT  To: Royal Ramirez DO  Subject: FW: Birth Control                                  ----- Message -----  From: Chantale Jovel  Sent: 11/2/2022   4:50 AM EDT  To: Natasha [facility]  Subject: Birth Control                                    Good morning Dr Annette Soto,    After I saw you this summer my periods went back to lighter and shorter  They have not been as regular and my PMS symptoms seem to drag on more  I would like to go back on a birth control pill that I was on in 2020  I felt that I was more even and since I am getting older it may be helpful to regulate as I start to go through more hormonal changes        Thank you,    David Randolph  [phone]

## 2022-11-02 NOTE — TELEPHONE ENCOUNTER
Pt had menses approx 2 wk ago, having unprotected intercourse, would like to restart ocps in continuous fashion  Advised to await next menses with starting ocps  Scheduled 3 month f/u appt  Please sign off on presc to [facility])

## 2022-11-02 NOTE — TELEPHONE ENCOUNTER
Pt's insur will only allow monthly presc so will adjust rfs - informed pt takes in continuous fashion x 3 months as noted on presc

## 2022-12-21 ENCOUNTER — RA CDI HCC (OUTPATIENT)
Dept: OTHER | Facility: HOSPITAL | Age: 47
End: 2022-12-21

## 2022-12-21 NOTE — PROGRESS NOTES
[facility]  coding opportunities       Chart reviewed, no opportunity found: CHART REVIEWED, NO OPPORTUNITY FOUND        Patients Insurance        Commercial Insurance: [address] [place] [address]

## 2022-12-27 ENCOUNTER — APPOINTMENT (OUTPATIENT)
Dept: LAB | Age: 47
End: 2022-12-27

## 2022-12-27 DIAGNOSIS — D17.71 RENAL ANGIOMYOLIPOMA: ICD-10-CM

## 2022-12-27 DIAGNOSIS — I34.1 MITRAL VALVE PROLAPSE: ICD-10-CM

## 2022-12-27 DIAGNOSIS — R17 ELEVATED BILIRUBIN: ICD-10-CM

## 2022-12-27 DIAGNOSIS — D58.0 HEREDITARY SPHEROCYTOSIS (HCC): ICD-10-CM

## 2022-12-27 DIAGNOSIS — E78.01 FAMILIAL HYPERCHOLESTEROLEMIA: ICD-10-CM

## 2022-12-27 DIAGNOSIS — E16.2 HYPOGLYCEMIA: ICD-10-CM

## 2022-12-27 DIAGNOSIS — E55.9 VITAMIN D DEFICIENCY: ICD-10-CM

## 2022-12-27 DIAGNOSIS — Z23 ENCOUNTER FOR IMMUNIZATION: ICD-10-CM

## 2022-12-27 LAB
25(OH)D3 SERPL-MCNC: 31.1 NG/ML (ref 30–100)
ALBUMIN SERPL BCP-MCNC: 4.1 G/DL (ref 3.5–5)
ALP SERPL-CCNC: 45 U/L (ref 46–116)
ALT SERPL W P-5'-P-CCNC: 21 U/L (ref 12–78)
ANION GAP SERPL CALCULATED.3IONS-SCNC: 4 MMOL/L (ref 4–13)
AST SERPL W P-5'-P-CCNC: 17 U/L (ref 5–45)
BASOPHILS # BLD AUTO: 0.06 THOUSANDS/ÂΜL (ref 0–0.1)
BASOPHILS NFR BLD AUTO: 1 % (ref 0–1)
BILIRUB DIRECT SERPL-MCNC: 0.5 MG/DL (ref 0–0.2)
BILIRUB SERPL-MCNC: 1.87 MG/DL (ref 0.2–1)
BUN SERPL-MCNC: 13 MG/DL (ref 5–25)
CALCIUM SERPL-MCNC: 9.2 MG/DL (ref 8.3–10.1)
CHLORIDE SERPL-SCNC: 107 MMOL/L (ref 96–108)
CHOLEST SERPL-MCNC: 168 MG/DL
CO2 SERPL-SCNC: 28 MMOL/L (ref 21–32)
CREAT SERPL-MCNC: 0.7 MG/DL (ref 0.6–1.3)
EOSINOPHIL # BLD AUTO: 0.07 THOUSAND/ÂΜL (ref 0–0.61)
EOSINOPHIL NFR BLD AUTO: 1 % (ref 0–6)
ERYTHROCYTE [DISTWIDTH] IN BLOOD BY AUTOMATED COUNT: 15.6 % (ref 11.6–15.1)
GFR SERPL CREATININE-BSD FRML MDRD: 102 ML/MIN/1.73SQ M
GLUCOSE P FAST SERPL-MCNC: 102 MG/DL (ref 65–99)
HCT VFR BLD AUTO: 35.4 % (ref 34.8–46.1)
HDLC SERPL-MCNC: 85 MG/DL
HGB BLD-MCNC: 11.8 G/DL (ref 11.5–15.4)
IMM GRANULOCYTES # BLD AUTO: 0.03 THOUSAND/UL (ref 0–0.2)
IMM GRANULOCYTES NFR BLD AUTO: 0 % (ref 0–2)
LDLC SERPL CALC-MCNC: 72 MG/DL (ref 0–100)
LDLC SERPL DIRECT ASSAY-MCNC: 69 MG/DL (ref 0–100)
LYMPHOCYTES # BLD AUTO: 0.6 THOUSANDS/ÂΜL (ref 0.6–4.47)
LYMPHOCYTES NFR BLD AUTO: 7 % (ref 14–44)
MCH RBC QN AUTO: 29.8 PG (ref 26.8–34.3)
MCHC RBC AUTO-ENTMCNC: 33.3 G/DL (ref 31.4–37.4)
MCV RBC AUTO: 89 FL (ref 82–98)
MONOCYTES # BLD AUTO: 0.43 THOUSAND/ÂΜL (ref 0.17–1.22)
MONOCYTES NFR BLD AUTO: 5 % (ref 4–12)
NEUTROPHILS # BLD AUTO: 7.47 THOUSANDS/ÂΜL (ref 1.85–7.62)
NEUTS SEG NFR BLD AUTO: 86 % (ref 43–75)
NONHDLC SERPL-MCNC: 83 MG/DL
NRBC BLD AUTO-RTO: 0 /100 WBCS
PLATELET # BLD AUTO: 164 THOUSANDS/UL (ref 149–390)
PMV BLD AUTO: 10.9 FL (ref 8.9–12.7)
POTASSIUM SERPL-SCNC: 4.3 MMOL/L (ref 3.5–5.3)
PROT SERPL-MCNC: 7.1 G/DL (ref 6.4–8.4)
RBC # BLD AUTO: 3.96 MILLION/UL (ref 3.81–5.12)
SODIUM SERPL-SCNC: 139 MMOL/L (ref 135–147)
TRIGL SERPL-MCNC: 57 MG/DL
WBC # BLD AUTO: 8.66 THOUSAND/UL (ref 4.31–10.16)

## 2022-12-29 ENCOUNTER — OFFICE VISIT (OUTPATIENT)
Dept: INTERNAL MEDICINE CLINIC | Facility: CLINIC | Age: 47
End: 2022-12-29

## 2022-12-29 VITALS
RESPIRATION RATE: 15 BRPM | WEIGHT: 139.4 LBS | HEART RATE: 73 BPM | HEIGHT: 66 IN | OXYGEN SATURATION: 99 % | DIASTOLIC BLOOD PRESSURE: 74 MMHG | BODY MASS INDEX: 22.4 KG/M2 | SYSTOLIC BLOOD PRESSURE: 114 MMHG

## 2022-12-29 DIAGNOSIS — D17.71 RENAL ANGIOMYOLIPOMA: ICD-10-CM

## 2022-12-29 DIAGNOSIS — Z12.11 SCREENING FOR COLON CANCER: ICD-10-CM

## 2022-12-29 DIAGNOSIS — I34.1 MITRAL VALVE PROLAPSE: ICD-10-CM

## 2022-12-29 DIAGNOSIS — J06.9 VIRAL URI: Primary | ICD-10-CM

## 2022-12-29 DIAGNOSIS — E78.01 FAMILIAL HYPERCHOLESTEROLEMIA: ICD-10-CM

## 2022-12-29 DIAGNOSIS — D58.0 HEREDITARY SPHEROCYTOSIS (HCC): ICD-10-CM

## 2022-12-29 DIAGNOSIS — E55.9 VITAMIN D DEFICIENCY: ICD-10-CM

## 2022-12-29 DIAGNOSIS — Z23 ENCOUNTER FOR IMMUNIZATION: ICD-10-CM

## 2022-12-29 RX ORDER — DEXTROMETHORPHAN HYDROBROMIDE AND PROMETHAZINE HYDROCHLORIDE 15; 6.25 MG/5ML; MG/5ML
5 SOLUTION ORAL 4 TIMES DAILY PRN
Qty: 180 ML | Refills: 0 | Status: SHIPPED | OUTPATIENT
Start: 2022-12-29

## 2022-12-29 NOTE — PROGRESS NOTES
Assessment/Plan:    #URI  -present several days  -daughter with similar symptoms but now resolved  -denies fever  -has cough that is nonproductive  -will start on promethazine    #Menstrual Dizziness  -about 1 week prior to periods  -denies headaches or vision changes or tinnitis  -has tried birth control but felt worse and stopped  -mother and sister had menopuase age 46 and patient is hopeful that she will feel better soon  -defers NSAIDS for now  -will check iron panel    #COVID  -infection August 2022 and now resolved    #Hereditary Spherocytosis  -chronic  -diagnosed at age 24  -has splenomegaly  -CBC remainsstable  -bilirubin elevated secondary to stress  -will repeat hepatic function panel in 3 weeks  -no longer taking folic acid  -U0V is depressed secondary to hereditary spherocytosis, awaiting a1c results  -may need to see hematology if bilirubin continues to remain elevated    #Angiomyolipoma  -over right kidney  -awaiting US kidney    #HLD  -LDL 59 HDL 77  -continue diet and exercise     #Vitamin D Deficiency  -now at 31 1 and stable  -continue on vitamin D 2000 units daily     #Low A1c  -likely secondary to hereditary spherocytosis  -denies hypoglycemic episodes, random glucose stable  -awaiting a1c  -glucose 102 and stable    #IBS  -bloating on occasion  -controlled with diet  -symptoms usually around menstrual cycle    #Left Ovarian Cyst  -undergoing surveillance with gynecology    #Anxiety  -no longer an issue    #MVP  -seen on 2020 ECHO and stable  -saw cardiology but did not require further intervention  -denies symptoms     #Health Maintenance  -routine labs and followup 1 year  -covid bivalent booster pending  -flu vaccine up to date 2022  -is a teacher  -mammogram up to date 2022  -colonoscopy due  -TDAP pending    No problem-specific Assessment & Plan notes found for this encounter         Diagnoses and all orders for this visit:    Viral URI  -     CBC and differential; Future  - Comprehensive metabolic panel; Future  -     Promethazine-DM (PHENERGAN-DM) 6 25-15 mg/5 mL oral syrup; Take 5 mL by mouth 4 (four) times a day as needed for cough    Encounter for immunization  -     CBC and differential; Future  -     Comprehensive metabolic panel; Future  -     tetanus-diphtheria-acellular pertussis (ADACEL) 5-2-15 5 LF-mcg/0 5 injection; Inject 0 5 mL into a muscle once for 1 dose    Renal angiomyolipoma  -     CBC and differential; Future  -     Comprehensive metabolic panel; Future  -     US kidney and bladder; Future    Hereditary spherocytosis (HCC)  -     CBC and differential; Future  -     Comprehensive metabolic panel; Future  -     Iron, TIBC and Ferritin Panel; Future  -     Folate; Future  -     Vitamin B12; Future    Familial hypercholesterolemia  -     CBC and differential; Future  -     Comprehensive metabolic panel; Future  -     Lipid Panel with Direct LDL reflex; Future    Vitamin D deficiency  -     CBC and differential; Future  -     Comprehensive metabolic panel; Future  -     Vitamin D 25 hydroxy; Future    Screening for colon cancer  -     Ambulatory referral for colonoscopy; Future    Mitral valve prolapse            Current Outpatient Medications:   •  Promethazine-DM (PHENERGAN-DM) 6 25-15 mg/5 mL oral syrup, Take 5 mL by mouth 4 (four) times a day as needed for cough, Disp: 180 mL, Rfl: 0  •  tetanus-diphtheria-acellular pertussis (ADACEL) 5-2-15 5 LF-mcg/0 5 injection, Inject 0 5 mL into a muscle once for 1 dose, Disp: 0 5 mL, Rfl: 0  •  Cholecalciferol (Vitamin D) 50 MCG (2000 UT) tablet, Take 2,000 Units by mouth daily, Disp: , Rfl:   •  Multiple Vitamins-Minerals (multivitamin with minerals) tablet, Take 1 tablet by mouth daily, Disp: , Rfl:   •  NON FORMULARY, Dim plus estrogen metabolism, Disp: , Rfl:     Subjective:      Patient ID: Moris Dial is a 49 y o  female  HPI     Patient presents for routine checkup    Denies any recent hospitalizations or surgeries  She has been having cough especially at nighttime that has been keeping her up at night  Her daughter had similar symptoms and has since recovered  We will start her on promethazine  She denies any fevers or any productive sputum  She is due for screening colonoscopy and we will provide her with a referral   She is also due for a repeat ultrasound of the kidney to monitor her angiomyolipoma  We will check an iron panel as well as folate and B12  She has hereditary severe types of cysts and remained stable  Her bilirubin is elevated  We will continue to trend this  Her LDL is 69 and HDL 85 currently well controlled with diet and exercise  Her vitamin D is now 31 1 and she will continue with vitamin D supplementation  She has been having intermittent bouts of dizziness and lightheadedness that are associated with her menstrual cycle  She states that she gets these a week before her cycle  She denies any headaches  She has tried birth control with her gynecologist however stopped this because it was making her feel worse  She is now doing fine and she we will monitor this  She will return to care in 1 year  The following portions of the patient's history were reviewed and updated as appropriate: allergies, current medications, past family history, past medical history, past social history, past surgical history and problem list     Review of Systems   Constitutional: Negative for activity change, appetite change, chills, fatigue and fever  HENT: Negative for congestion, ear pain, facial swelling, hearing loss, sore throat, tinnitus and trouble swallowing  Eyes: Negative for photophobia and visual disturbance  Respiratory: Positive for cough  Negative for shortness of breath and wheezing  Cardiovascular: Negative for chest pain and leg swelling  Gastrointestinal: Negative for abdominal distention, abdominal pain, blood in stool, nausea and vomiting     Genitourinary: Negative for difficulty urinating, dysuria and pelvic pain  Musculoskeletal: Negative for arthralgias, back pain, gait problem, joint swelling, myalgias, neck pain and neck stiffness  Skin: Negative for rash and wound  Neurological: Positive for dizziness  Negative for tremors, light-headedness and headaches  Objective:      /74   Pulse 73   Resp 15   Ht 5' 6" (1 676 m)   Wt 63 2 kg (139 lb 6 4 oz)   SpO2 99%   BMI 22 50 kg/m²          Physical Exam  Vitals reviewed  Constitutional:       Appearance: Normal appearance  She is well-developed  HENT:      Head: Normocephalic and atraumatic  Right Ear: Tympanic membrane, ear canal and external ear normal  There is no impacted cerumen  Left Ear: Tympanic membrane, ear canal and external ear normal  There is no impacted cerumen  Eyes:      Conjunctiva/sclera: Conjunctivae normal       Pupils: Pupils are equal, round, and reactive to light  Neck:      Thyroid: No thyromegaly  Vascular: No JVD  Cardiovascular:      Rate and Rhythm: Normal rate and regular rhythm  Pulses: Normal pulses  Heart sounds: Normal heart sounds  No murmur heard  Pulmonary:      Effort: Pulmonary effort is normal  No respiratory distress  Breath sounds: Normal breath sounds  No stridor  No wheezing, rhonchi or rales  Abdominal:      General: Bowel sounds are normal  There is no distension  Palpations: Abdomen is soft  There is no mass  Tenderness: There is no abdominal tenderness  There is no right CVA tenderness, left CVA tenderness, guarding or rebound  Musculoskeletal:         General: No tenderness  Normal range of motion  Cervical back: Normal range of motion and neck supple  Right lower leg: No edema  Left lower leg: No edema  Lymphadenopathy:      Cervical: No cervical adenopathy  Skin:     General: Skin is warm  Findings: No erythema or rash     Neurological:      Mental Status: She is alert and oriented to person, place, and time  Mental status is at baseline  Deep Tendon Reflexes: Reflexes are normal and symmetric  Psychiatric:         Mood and Affect: Mood normal          Behavior: Behavior normal          Thought Content: Thought content normal          Judgment: Judgment normal            This note was completed in part utilizing [employer] fluency direct voice recognition software  Grammatical errors, random word insertion, spelling mistakes, and incomplete sentences may be an occasional consequence of the system secondary to software limitations, ambient noise and hardware issues  At the time of dictation, efforts were made to edit, clarify and /or correct errors  Please read the chart carefully and recognize, using context, where substitutions have occurred  If you have any questions or concerns about the context, text or information contained within the body of this dictation, please contact myself, the provider, for further clarification

## 2023-01-03 DIAGNOSIS — E16.2 HYPOGLYCEMIA: Primary | ICD-10-CM

## 2023-01-03 LAB
EST. AVERAGE GLUCOSE BLD GHB EST-MCNC: <74 MG/DL
HBA1C MFR BLD: <4.2 %

## 2023-03-14 ENCOUNTER — HOSPITAL ENCOUNTER (OUTPATIENT)
Dept: RADIOLOGY | Age: 48
Discharge: HOME | End: 2023-03-14

## 2023-03-14 DIAGNOSIS — D17.71 RENAL ANGIOMYOLIPOMA: ICD-10-CM

## 2023-07-12 ENCOUNTER — ANNUAL EXAM (OUTPATIENT)
Dept: OBGYN CLINIC | Facility: CLINIC | Age: 48
End: 2023-07-12
Payer: COMMERCIAL

## 2023-07-12 VITALS
HEIGHT: 66 IN | WEIGHT: 135 LBS | BODY MASS INDEX: 21.69 KG/M2 | DIASTOLIC BLOOD PRESSURE: 78 MMHG | SYSTOLIC BLOOD PRESSURE: 120 MMHG

## 2023-07-12 DIAGNOSIS — Z01.419 ENCOUNTER FOR ANNUAL ROUTINE GYNECOLOGICAL EXAMINATION: Primary | ICD-10-CM

## 2023-07-12 DIAGNOSIS — Z12.31 ENCOUNTER FOR SCREENING MAMMOGRAM FOR MALIGNANT NEOPLASM OF BREAST: ICD-10-CM

## 2023-07-12 DIAGNOSIS — N90.89 VULVAL LESION: ICD-10-CM

## 2023-07-12 PROCEDURE — S0612 ANNUAL GYNECOLOGICAL EXAMINA: HCPCS | Performed by: OBSTETRICS & GYNECOLOGY

## 2023-07-13 DIAGNOSIS — E53.8 FOLATE DEFICIENCY: Primary | ICD-10-CM

## 2023-07-13 RX ORDER — FOLIC ACID 1 MG/1
1 TABLET ORAL DAILY
Qty: 90 TABLET | Refills: 3 | Status: SHIPPED | OUTPATIENT
Start: 2023-07-13

## 2023-08-09 ENCOUNTER — HOSPITAL ENCOUNTER (OUTPATIENT)
Dept: RADIOLOGY | Facility: IMAGING CENTER | Age: 48
Discharge: HOME | End: 2023-08-09
Payer: COMMERCIAL

## 2023-08-09 VITALS — BODY MASS INDEX: 20.4 KG/M2 | HEIGHT: 67 IN | WEIGHT: 130 LBS

## 2023-08-09 DIAGNOSIS — Z12.31 ENCOUNTER FOR SCREENING MAMMOGRAM FOR MALIGNANT NEOPLASM OF BREAST: ICD-10-CM

## 2023-08-09 PROCEDURE — 77067 SCR MAMMO BI INCL CAD: CPT

## 2023-08-09 PROCEDURE — 77063 BREAST TOMOSYNTHESIS BI: CPT

## 2023-12-21 ENCOUNTER — OFFICE VISIT (OUTPATIENT)
Dept: URGENT CARE | Age: 48
End: 2023-12-21
Payer: COMMERCIAL

## 2023-12-21 VITALS
HEART RATE: 68 BPM | SYSTOLIC BLOOD PRESSURE: 130 MMHG | RESPIRATION RATE: 14 BRPM | OXYGEN SATURATION: 98 % | DIASTOLIC BLOOD PRESSURE: 66 MMHG | TEMPERATURE: 98.4 F

## 2023-12-21 DIAGNOSIS — R00.2 PALPITATIONS: Primary | ICD-10-CM

## 2023-12-21 LAB
ATRIAL RATE: 64 BPM
P AXIS: 59 DEGREES
PR INTERVAL: 132 MS
QRS AXIS: 31 DEGREES
QRSD INTERVAL: 88 MS
QT INTERVAL: 438 MS
QTC INTERVAL: 451 MS
T WAVE AXIS: 29 DEGREES
VENTRICULAR RATE: 64 BPM

## 2023-12-21 PROCEDURE — 93005 ELECTROCARDIOGRAM TRACING: CPT | Performed by: NURSE PRACTITIONER

## 2023-12-21 PROCEDURE — 99213 OFFICE O/P EST LOW 20 MIN: CPT | Performed by: NURSE PRACTITIONER

## 2023-12-21 NOTE — PROGRESS NOTES
[facility] Now        NAME: Janiya Valle is a 48 y.o. female  : 1975    MRN: [id number]  DATE: 2023  TIME: 4:42 PM    Assessment and Plan   Palpitations [R00.2]  1. Palpitations              Patient Instructions     EKG is NSR  Consider a 24 hrs monitor if appropriate  Follow up with PCP in the AM  Proceed to  ER if symptoms worsen.    Chief Complaint     Chief Complaint   Patient presents with    Cold Like Symptoms     Rapid heart beat per patient x 3 weeks cardiology appt is not until 2023  Pt has a mytro value issue she states          History of Present Illness       HPI  Presents to clinic with complaint of intermittent irregular heartbeat for about 3 weeks.  Has a history of mitral valve surgery.  Denies pain. States she recently stopped intake of caffeine and that helped with the palpitations. However they resumed again today. No current symptoms. Has an appt with cardiology and PCP in January.     Review of Systems   Review of Systems   Respiratory:  Negative for chest tightness, shortness of breath and wheezing.    Cardiovascular:  Positive for palpitations.   Gastrointestinal:  Negative for abdominal pain and nausea.   Neurological:  Negative for light-headedness.         Current Medications       Current Outpatient Medications:     Cholecalciferol (Vitamin D) 50 MCG (2000 UT) tablet, Take 2,000 Units by mouth daily (Patient not taking: Reported on 2023), Disp: , Rfl:     folic acid (FOLVITE) 1 mg tablet, Take 1 tablet (1 mg total) by mouth daily, Disp: 90 tablet, Rfl: 3    Multiple Vitamins-Minerals (multivitamin with minerals) tablet, Take 1 tablet by mouth daily, Disp: , Rfl:     NON FORMULARY, Dim plus estrogen metabolism, Disp: , Rfl:     Promethazine-DM (PHENERGAN-DM) 6.25-15 mg/5 mL oral syrup, Take 5 mL by mouth 4 (four) times a day as needed for cough, Disp: 180 mL, Rfl: 0    Current Allergies     Allergies as of 2023 - Reviewed 2023   Allergen  Reaction Noted    Penicillins Anaphylaxis and Hives 01/04/2013    Cephalexin  01/04/2013            The following portions of the patient's history were reviewed and updated as appropriate: allergies, current medications, past family history, past medical history, past social history, past surgical history and problem list.     Past Medical History:   Diagnosis Date    Allergic     Anemia     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 (HCC)     Mitral valve prolapse        Past Surgical History:   Procedure Laterality Date    BREAST BIOPSY Left 2017    DENTAL SURGERY      US GUIDED BREAST BIOPSY LEFT COMPLETE Left 7/31/2017       Family History   Problem Relation Age of Onset    No Known Problems Mother     No Known Problems Father     No Known Problems Sister     No Known Problems Sister     No Known Problems Daughter     No Known Problems Daughter     No Known Problems Maternal Grandmother     No Known Problems Maternal Grandfather     No Known Problems Paternal Grandmother     No Known Problems Paternal Grandfather     No Known Problems Paternal Aunt     Hypertension Family     Gout Family     Coronary artery disease Family     Hereditary spherocytosis Family          Medications have been verified.        Objective   /66 (BP Location: Left arm, Patient Position: Sitting, Cuff Size: Adult)   Pulse 68   Temp 98.4 °F (36.9 °C) (Tympanic)   Resp 14   SpO2 98%   No LMP recorded.       Physical Exam     Physical Exam  Constitutional:       General: She is not in acute distress.     Appearance: She is not diaphoretic.   Neck:      Vascular: No carotid bruit.   Cardiovascular:      Rate and Rhythm: Normal rate and regular rhythm.      Heart sounds: Normal heart sounds. No murmur heard.  Pulmonary:      Effort: Pulmonary effort is normal.      Breath sounds: Normal breath sounds.

## 2023-12-26 ENCOUNTER — OFFICE VISIT (OUTPATIENT)
Dept: FAMILY MEDICINE CLINIC | Facility: CLINIC | Age: 48
End: 2023-12-26
Payer: COMMERCIAL

## 2023-12-26 VITALS
RESPIRATION RATE: 16 BRPM | WEIGHT: 136 LBS | SYSTOLIC BLOOD PRESSURE: 118 MMHG | DIASTOLIC BLOOD PRESSURE: 82 MMHG | OXYGEN SATURATION: 100 % | BODY MASS INDEX: 21.35 KG/M2 | TEMPERATURE: 97.6 F | HEART RATE: 59 BPM | HEIGHT: 67 IN

## 2023-12-26 DIAGNOSIS — D58.0 HEREDITARY SPHEROCYTOSIS (HCC): Chronic | ICD-10-CM

## 2023-12-26 DIAGNOSIS — R00.2 PALPITATION: Primary | ICD-10-CM

## 2023-12-26 PROCEDURE — 99213 OFFICE O/P EST LOW 20 MIN: CPT | Performed by: FAMILY MEDICINE

## 2023-12-26 NOTE — ASSESSMENT & PLAN NOTE
Evaluated at urgent care a few days ago, was asymptomatic at that time, EKG unrevealing, asymptomatic currently. Will check Holter monitor and repeat echo given her hx of MVP prior to cardiology follow up in a few weeks.

## 2023-12-26 NOTE — PROGRESS NOTES
Name: Janiya Valle      : 1975      MRN: [id number]  Encounter Provider: Gladys Herrmann DO  Encounter Date: 2023   Encounter department: [facility]    Assessment & Plan     1. Palpitation  Assessment & Plan:  Evaluated at urgent care a few days ago, was asymptomatic at that time, EKG unrevealing, asymptomatic currently. Will check Holter monitor and repeat echo given her hx of MVP prior to cardiology follow up in a few weeks.     Orders:  -     Holter monitor; Future; Expected date: 2023  -     Echo complete w/ contrast if indicated; Future; Expected date: 2023  -     TSH, 3rd generation with Free T4 reflex; Future  -     CBC; Future  -     Iron Panel (Includes Ferritin, Iron Sat%, Iron, and TIBC); Future  -     Lipid panel; Future  -     Comprehensive metabolic panel; Future  -     Folate; Future  -     Vitamin B12; Future  -     Hemoglobin A1C; Future    2. Hereditary spherocytosis (HCC)         Subjective      HPI  Was seen in urgent care for palpitations. Was not having symptoms at the time and EKG was normal. Has had palpitations in the past, nearly daily, some days more than others. Initial onset 10 years ago, was dx with MVP. Saw cardiologist a few years ago. This usually happens right before her menstrual cycle. Has plans to see new PCP and cardiology in February. No associated chest pain but does feel nervous when it happens. Has a watch that monitors HR, HR normal during episodes.    Review of Systems    Current Outpatient Medications on File Prior to Visit   Medication Sig   • folic acid (FOLVITE) 1 mg tablet Take 1 tablet (1 mg total) by mouth daily   • Multiple Vitamins-Minerals (multivitamin with minerals) tablet Take 1 tablet by mouth daily   • NON FORMULARY Dim plus estrogen metabolism   • Cholecalciferol (Vitamin D) 50 MCG ( UT) tablet Take 2,000 Units by mouth daily (Patient not taking: Reported on 2023)   • [DISCONTINUED] Promethazine-DM  "(PHENERGAN-DM) 6.25-15 mg/5 mL oral syrup Take 5 mL by mouth 4 (four) times a day as needed for cough       Objective     /82   Pulse 59   Temp 97.6 °F (36.4 °C) (Temporal)   Resp 16   Ht 5' 7\" (1.702 m)   Wt 61.7 kg (136 lb)   LMP 12/12/2023 (Approximate)   SpO2 100%   BMI 21.30 kg/m²     Physical Exam  Vitals reviewed.   Constitutional:       Appearance: Normal appearance.   Cardiovascular:      Rate and Rhythm: Normal rate and regular rhythm.      Pulses: Normal pulses.   Pulmonary:      Effort: Pulmonary effort is normal.      Breath sounds: Normal breath sounds.   Abdominal:      General: Abdomen is flat.   Skin:     General: Skin is warm and dry.      Capillary Refill: Capillary refill takes less than 2 seconds.   Neurological:      Mental Status: She is alert.   Psychiatric:         Mood and Affect: Mood normal.         Behavior: Behavior normal.       Gladys Herrmann, DO    "

## 2023-12-27 ENCOUNTER — APPOINTMENT (OUTPATIENT)
Dept: LAB | Age: 48
End: 2023-12-27
Payer: COMMERCIAL

## 2023-12-27 DIAGNOSIS — R00.2 PALPITATION: ICD-10-CM

## 2023-12-27 LAB
ALBUMIN SERPL BCP-MCNC: 4.4 G/DL (ref 3.5–5)
ALP SERPL-CCNC: 49 U/L (ref 34–104)
ALT SERPL W P-5'-P-CCNC: 22 U/L (ref 7–52)
ANION GAP SERPL CALCULATED.3IONS-SCNC: 9 MMOL/L
AST SERPL W P-5'-P-CCNC: 21 U/L (ref 13–39)
BILIRUB SERPL-MCNC: 1.02 MG/DL (ref 0.2–1)
BUN SERPL-MCNC: 16 MG/DL (ref 5–25)
CALCIUM SERPL-MCNC: 9.2 MG/DL (ref 8.4–10.2)
CHLORIDE SERPL-SCNC: 106 MMOL/L (ref 96–108)
CHOLEST SERPL-MCNC: 137 MG/DL
CO2 SERPL-SCNC: 28 MMOL/L (ref 21–32)
CREAT SERPL-MCNC: 0.63 MG/DL (ref 0.6–1.3)
ERYTHROCYTE [DISTWIDTH] IN BLOOD BY AUTOMATED COUNT: 15.4 % (ref 11.6–15.1)
FERRITIN SERPL-MCNC: 64 NG/ML (ref 11–307)
FOLATE SERPL-MCNC: >22.3 NG/ML
GFR SERPL CREATININE-BSD FRML MDRD: 105 ML/MIN/1.73SQ M
GLUCOSE P FAST SERPL-MCNC: 98 MG/DL (ref 65–99)
HCT VFR BLD AUTO: 33.7 % (ref 34.8–46.1)
HDLC SERPL-MCNC: 68 MG/DL
HGB BLD-MCNC: 11.5 G/DL (ref 11.5–15.4)
INSULIN SERPL-ACNC: 2.42 UIU/ML (ref 1.9–23)
IRON SATN MFR SERPL: 23 % (ref 15–50)
IRON SERPL-MCNC: 56 UG/DL (ref 50–212)
LDLC SERPL CALC-MCNC: 58 MG/DL (ref 0–100)
MCH RBC QN AUTO: 29.1 PG (ref 26.8–34.3)
MCHC RBC AUTO-ENTMCNC: 34.1 G/DL (ref 31.4–37.4)
MCV RBC AUTO: 85 FL (ref 82–98)
NONHDLC SERPL-MCNC: 69 MG/DL
PLATELET # BLD AUTO: 148 THOUSANDS/UL (ref 149–390)
PMV BLD AUTO: 11 FL (ref 8.9–12.7)
POTASSIUM SERPL-SCNC: 4.3 MMOL/L (ref 3.5–5.3)
PROT SERPL-MCNC: 6.3 G/DL (ref 6.4–8.4)
RBC # BLD AUTO: 3.95 MILLION/UL (ref 3.81–5.12)
SODIUM SERPL-SCNC: 143 MMOL/L (ref 135–147)
TIBC SERPL-MCNC: 247 UG/DL (ref 250–450)
TRIGL SERPL-MCNC: 53 MG/DL
TSH SERPL DL<=0.05 MIU/L-ACNC: 4.1 UIU/ML (ref 0.45–4.5)
UIBC SERPL-MCNC: 191 UG/DL (ref 155–355)
VIT B12 SERPL-MCNC: 225 PG/ML (ref 180–914)
WBC # BLD AUTO: 5.01 THOUSAND/UL (ref 4.31–10.16)

## 2023-12-27 PROCEDURE — 80053 COMPREHEN METABOLIC PANEL: CPT

## 2023-12-27 PROCEDURE — 82746 ASSAY OF FOLIC ACID SERUM: CPT

## 2023-12-27 PROCEDURE — 82607 VITAMIN B-12: CPT

## 2023-12-27 PROCEDURE — 84443 ASSAY THYROID STIM HORMONE: CPT

## 2023-12-27 PROCEDURE — 83036 HEMOGLOBIN GLYCOSYLATED A1C: CPT

## 2023-12-27 PROCEDURE — 82728 ASSAY OF FERRITIN: CPT

## 2023-12-27 PROCEDURE — 85027 COMPLETE CBC AUTOMATED: CPT

## 2023-12-27 PROCEDURE — 83540 ASSAY OF IRON: CPT

## 2023-12-27 PROCEDURE — 83550 IRON BINDING TEST: CPT

## 2023-12-27 PROCEDURE — 80061 LIPID PANEL: CPT

## 2023-12-28 LAB — C PEPTIDE SERPL-MCNC: 1.8 NG/ML (ref 1.1–4.4)

## 2023-12-29 ENCOUNTER — TELEPHONE (OUTPATIENT)
Dept: FAMILY MEDICINE CLINIC | Facility: CLINIC | Age: 48
End: 2023-12-29

## 2023-12-29 ENCOUNTER — APPOINTMENT (OUTPATIENT)
Dept: LAB | Facility: CLINIC | Age: 48
End: 2023-12-29
Payer: COMMERCIAL

## 2023-12-29 ENCOUNTER — HOSPITAL ENCOUNTER (OUTPATIENT)
Dept: NON INVASIVE DIAGNOSTICS | Facility: HOSPITAL | Age: 48
Discharge: HOME | End: 2023-12-29
Payer: COMMERCIAL

## 2023-12-29 VITALS
DIASTOLIC BLOOD PRESSURE: 82 MMHG | SYSTOLIC BLOOD PRESSURE: 118 MMHG | WEIGHT: 136.02 LBS | HEIGHT: 67 IN | BODY MASS INDEX: 21.35 KG/M2 | HEART RATE: 60 BPM

## 2023-12-29 DIAGNOSIS — D58.0 HEREDITARY SPHEROCYTOSIS (HCC): ICD-10-CM

## 2023-12-29 DIAGNOSIS — D17.71 RENAL ANGIOMYOLIPOMA: ICD-10-CM

## 2023-12-29 DIAGNOSIS — R00.2 PALPITATION: ICD-10-CM

## 2023-12-29 DIAGNOSIS — E55.9 VITAMIN D DEFICIENCY: ICD-10-CM

## 2023-12-29 DIAGNOSIS — Z23 ENCOUNTER FOR IMMUNIZATION: ICD-10-CM

## 2023-12-29 DIAGNOSIS — E78.01 FAMILIAL HYPERCHOLESTEROLEMIA: ICD-10-CM

## 2023-12-29 DIAGNOSIS — J06.9 VIRAL URI: ICD-10-CM

## 2023-12-29 LAB
AORTIC ROOT: 3.6 CM
APICAL FOUR CHAMBER EJECTION FRACTION: 63 %
ASCENDING AORTA: 3.2 CM
BETA-HYDROXYBUTYRATE: 0.2 MMOL/L
E WAVE DECELERATION TIME: 129 MS
E/A RATIO: 1.04
FRACTIONAL SHORTENING: 36 (ref 28–44)
INTERVENTRICULAR SEPTUM IN DIASTOLE (PARASTERNAL SHORT AXIS VIEW): 0.9 CM
INTERVENTRICULAR SEPTUM: 0.9 CM (ref 0.6–1.1)
LAAS-AP2: 22.6 CM2
LAAS-AP4: 19.2 CM2
LEFT ATRIUM SIZE: 3.1 CM
LEFT ATRIUM VOLUME (MOD BIPLANE): 63 ML
LEFT ATRIUM VOLUME INDEX (MOD BIPLANE): 36.6 ML/M2
LEFT INTERNAL DIMENSION IN SYSTOLE: 3.2 CM (ref 2.1–4)
LEFT VENTRICULAR INTERNAL DIMENSION IN DIASTOLE: 5 CM (ref 3.5–6)
LEFT VENTRICULAR POSTERIOR WALL IN END DIASTOLE: 0.9 CM
LEFT VENTRICULAR STROKE VOLUME: 78 ML
LVSV (TEICH): 78 ML
MITRAL REGURGITATION PEAK VELOCITY: 5.41 M/S
MITRAL VALVE MEAN INFLOW VELOCITY: 4.28 M/S
MITRAL VALVE REGURGITANT PEAK GRADIENT: 117 MMHG
MV E'TISSUE VEL-LAT: 14 CM/S
MV E'TISSUE VEL-SEP: 11 CM/S
MV PEAK A VEL: 0.67 M/S
MV PEAK E VEL: 70 CM/S
MV STENOSIS PRESSURE HALF TIME: 37 MS
MV VALVE AREA P 1/2 METHOD: 5.95
PROINSULIN SERPL-SCNC: 1.4 PMOL/L (ref 0–10)
RA PRESSURE ESTIMATED: 10 MMHG
RIGHT ATRIUM AREA SYSTOLE A4C: 16.4 CM2
RIGHT VENTRICLE ID DIMENSION: 3.6 CM
RV PSP: 32 MMHG
SL CV DOP CALC MV VTI RETROGRADE: 55.4 CM
SL CV LEFT ATRIUM LENGTH A2C: 5.3 CM
SL CV LV EF: 55
SL CV MV MEAN GRADIENT RETROGRADE: 85 MMHG
SL CV PED ECHO LEFT VENTRICLE DIASTOLIC VOLUME (MOD BIPLANE) 2D: 120 ML
SL CV PED ECHO LEFT VENTRICLE SYSTOLIC VOLUME (MOD BIPLANE) 2D: 42 ML
TR MAX PG: 22 MMHG
TR PEAK VELOCITY: 2.3 M/S
TRICUSPID ANNULAR PLANE SYSTOLIC EXCURSION: 3 CM
TRICUSPID VALVE PEAK REGURGITATION VELOCITY: 2.32 M/S

## 2023-12-29 PROCEDURE — 93306 TTE W/DOPPLER COMPLETE: CPT

## 2023-12-29 PROCEDURE — 93306 TTE W/DOPPLER COMPLETE: CPT | Performed by: INTERNAL MEDICINE

## 2024-01-02 ENCOUNTER — OFFICE VISIT (OUTPATIENT)
Dept: OBGYN CLINIC | Facility: CLINIC | Age: 49
End: 2024-01-02
Payer: COMMERCIAL

## 2024-01-02 ENCOUNTER — TELEPHONE (OUTPATIENT)
Dept: FAMILY MEDICINE CLINIC | Facility: CLINIC | Age: 49
End: 2024-01-02

## 2024-01-02 VITALS
SYSTOLIC BLOOD PRESSURE: 110 MMHG | BODY MASS INDEX: 21.25 KG/M2 | WEIGHT: 135.4 LBS | DIASTOLIC BLOOD PRESSURE: 66 MMHG | HEIGHT: 67 IN

## 2024-01-02 DIAGNOSIS — D58.0 HEREDITARY SPHEROCYTOSIS (HCC): Chronic | ICD-10-CM

## 2024-01-02 DIAGNOSIS — N95.1 PERIMENOPAUSE: Primary | ICD-10-CM

## 2024-01-02 DIAGNOSIS — R00.2 PALPITATION: ICD-10-CM

## 2024-01-02 PROCEDURE — 99213 OFFICE O/P EST LOW 20 MIN: CPT | Performed by: OBSTETRICS & GYNECOLOGY

## 2024-01-02 NOTE — PROGRESS NOTES
Assessment/Plan:  Reviewed menstrual diary.  Reviewed signs and symptoms of perimenopause.  She will keep a menstrual diary and call with update in 1 to 2 months.  Discussed if her cycles are prolonged would then proceed with ultrasound/endometrial biopsy.  All questions answered.  Resume annual GYN exam 2024.  She will continue to follow-up with primary care and cardiology as scheduled.  No problem-specific Assessment & Plan notes found for this encounter.       Diagnoses and all orders for this visit:    Perimenopause    Palpitation    Hereditary spherocytosis (HCC)          Subjective:      Patient ID: Janiya Valle is a 48 y.o. female.    HPI  This is a pleasant 48-year-old female P2 ( x 2, age 16, 11)  Presents with concerns of perimenopausal symptoms.  She states her menstrual cycles are regular every 32 days up until 2023.  She states she skipped her menstrual cycle for October and then had a prolonged menses in November over 2 weeks.  In December she had a heavy cycle lasting 7 days passing large clots.  She states around Thanksgiving day she experienced palpitations which has been ongoing since then at variable intensities.  She was evaluated by primary care and underwent an EKG.  She does have a Holter monitor scheduled.  She does have a history of mitral valve prolapse and echocardiogram revealed with regurgitation.  She is scheduled to see a cardiologist next month.  She is inquiring whether these cardiac symptoms are related to perimenopause.  In general she would get slight palpitations prior to her onset of menses.  She denies any chest pain or shortness of breath.  There are no changes in bowel or bladder function.    She does have a hematologic disorder, spherocytosis.  Recent hemoglobin 11.5.    The following portions of the patient's history were reviewed and updated as appropriate: allergies, current medications, past family history, past medical history, past social history, past  "surgical history, and problem list.    Review of Systems   Constitutional:  Negative for fatigue, fever and unexpected weight change.   Respiratory:  Negative for cough, chest tightness, shortness of breath and wheezing.    Cardiovascular: Negative.  Negative for chest pain and palpitations.   Gastrointestinal: Negative.  Negative for abdominal distention, abdominal pain, blood in stool, constipation, diarrhea, nausea and vomiting.   Genitourinary: Negative.  Negative for difficulty urinating, dyspareunia, dysuria, flank pain, frequency, genital sores, hematuria, pelvic pain, urgency, vaginal bleeding, vaginal discharge and vaginal pain.   Skin:  Negative for rash.         Objective:      /66   Ht 5' 7\" (1.702 m)   Wt 61.4 kg (135 lb 6.4 oz)   LMP 11/29/2023 (Approximate) Comment: light flow x 2 wks, then flow x 4 days  BMI 21.21 kg/m²          Physical Exam      "

## 2024-01-02 NOTE — TELEPHONE ENCOUNTER
----- Message from Gladys Herrmann DO sent at 1/1/2024  2:01 PM EST -----  Labs reviewed - her iron is a bit low, she would benefit from taking a daily over the counter iron supplement. This may be the cause of her palpitations. Taking oral iron can cause dark stools and constipation. If intolerable we can set her up for iron infusions if she prefers. Liver and kidney function looks good as do blood counts. Lipid panel is normal. Thyroid function is normal. Folate and B12 are normal. Please ask her to complete holter monitor and echo prior to her follow up with cardiology.

## 2024-01-02 NOTE — TELEPHONE ENCOUNTER
Called and unable to spoke with patient in regards of her BW results and provider instructions. [place] message created and sent

## 2024-01-04 LAB
EST. AVERAGE GLUCOSE BLD GHB EST-MCNC: 74 MG/DL
HBA1C MFR BLD: 4.2 %

## 2024-01-10 ENCOUNTER — HOSPITAL ENCOUNTER (OUTPATIENT)
Dept: NON INVASIVE DIAGNOSTICS | Facility: CLINIC | Age: 49
Discharge: HOME | End: 2024-01-10
Payer: COMMERCIAL

## 2024-01-10 DIAGNOSIS — R00.2 PALPITATION: ICD-10-CM

## 2024-01-10 PROCEDURE — 93226 XTRNL ECG REC<48 HR SCAN A/R: CPT

## 2024-01-10 PROCEDURE — 93225 XTRNL ECG REC<48 HRS REC: CPT

## 2024-01-16 PROCEDURE — 93227 XTRNL ECG REC<48 HR R&I: CPT | Performed by: INTERNAL MEDICINE

## 2024-01-30 ENCOUNTER — RA CDI HCC (OUTPATIENT)
Dept: OTHER | Facility: HOSPITAL | Age: 49
End: 2024-01-30

## 2024-01-30 NOTE — PROGRESS NOTES
HCC coding opportunities       Chart reviewed, no opportunity found: CHART REVIEWED, NO OPPORTUNITY FOUND      This is a reminder to address (resolve/update/assess) ALL HCC (risk adjustment) codes as found on active problem list for 2024 as patient scores reset to zero AILYN.  Also, just a reminder to please review and assess all other chronic conditions for 2024  Patients Insurance        Commercial Insurance: [employer]

## 2024-02-06 ENCOUNTER — OFFICE VISIT (OUTPATIENT)
Dept: CARDIOLOGY CLINIC | Facility: CLINIC | Age: 49
End: 2024-02-06
Payer: COMMERCIAL

## 2024-02-06 VITALS
HEART RATE: 71 BPM | SYSTOLIC BLOOD PRESSURE: 112 MMHG | WEIGHT: 132 LBS | OXYGEN SATURATION: 99 % | BODY MASS INDEX: 20.67 KG/M2 | DIASTOLIC BLOOD PRESSURE: 70 MMHG

## 2024-02-06 DIAGNOSIS — I34.0 NONRHEUMATIC MITRAL VALVE REGURGITATION: ICD-10-CM

## 2024-02-06 DIAGNOSIS — R00.2 PALPITATION: ICD-10-CM

## 2024-02-06 DIAGNOSIS — R07.9 CHEST PAIN, UNSPECIFIED TYPE: ICD-10-CM

## 2024-02-06 DIAGNOSIS — I34.1 MITRAL VALVE PROLAPSE: Primary | ICD-10-CM

## 2024-02-06 PROCEDURE — 99213 OFFICE O/P EST LOW 20 MIN: CPT | Performed by: INTERNAL MEDICINE

## 2024-02-06 NOTE — PROGRESS NOTES
Cardiology Follow Up    Janiya Valle  1975  [id number]  [facility]ARDI[facility]ATH [facility]  [address] [facility] [address]  [phone]    1. Mitral valve prolapse        2. Chest pain, unspecified type        3. Palpitation        4. Nonrheumatic mitral valve regurgitation            Interval History: Cardiology follow-up.  Continuation of cardiac care.  She was last seen in our office 4 years ago.  Patient was clinically stable, completely asymptomatic from the cardiac point of view, until recently, she has been experiencing palpitations, denies any syncope or presyncope.  Symptoms started about 2 months ago.  BP is at times.  Palpitation is mostly constant.  Previously very intermittent.  She did experience some chest tightness with this.  There were no particular triggering factors, she noticed a decreased exercise tolerance.  She is able to run 7 miles, with some mild dyspnea noted.  She admits that she has been under some stress.  But this is improving symptoms are actually improving as well.  She was put on iron therapy for spherocytosis.  Denies any  orthopnea or PND.    Patient Active Problem List   Diagnosis    Allergic reaction    Hereditary spherocytosis (HCC)    Ventral hernia without obstruction or gangrene    Colicky RUQ abdominal pain    Elevated bilirubin    Abdominal pain    Renal angiomyolipoma    Chest pain    Mitral valve prolapse    Palpitation    Nonrheumatic mitral valve regurgitation     Past Medical History:   Diagnosis Date    Allergic     Anemia     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 (HCC)     Mitral valve prolapse      Social History     Socioeconomic History    Marital status: /Civil Union     Spouse name: Not on file    Number of children: Not on file    Years of education: Not on file    Highest education level: Not on file   Occupational History    Not on file   Tobacco Use    Smoking status:  Former    Smokeless tobacco: Never   Vaping Use    Vaping status: Never Used   Substance and Sexual Activity    Alcohol use: Yes     Alcohol/week: 2.0 standard drinks of alcohol     Types: 2 Glasses of wine per week    Drug use: No    Sexual activity: Yes     Partners: Male     Birth control/protection: None   Other Topics Concern    Not on file   Social History Narrative    Not on file     Social Determinants of Health     Financial Resource Strain: Not on file   Food Insecurity: Not on file   Transportation Needs: Not on file   Physical Activity: Not on file   Stress: Not on file   Social Connections: Not on file   Intimate Partner Violence: Not on file   Housing Stability: Not on file      Family History   Problem Relation Age of Onset    No Known Problems Mother     No Known Problems Father     No Known Problems Sister     No Known Problems Sister     No Known Problems Daughter     No Known Problems Daughter     No Known Problems Maternal Grandmother     No Known Problems Maternal Grandfather     No Known Problems Paternal Grandmother     No Known Problems Paternal Grandfather     No Known Problems Paternal Aunt     Hypertension Family     Gout Family     Coronary artery disease Family     Hereditary spherocytosis Family      Past Surgical History:   Procedure Laterality Date    BREAST BIOPSY Left 2017    DENTAL SURGERY      US GUIDED BREAST BIOPSY LEFT COMPLETE Left 7/31/2017       Current Outpatient Medications:     folic acid (FOLVITE) 1 mg tablet, Take 1 tablet (1 mg total) by mouth daily, Disp: 90 tablet, Rfl: 3    Multiple Vitamins-Minerals (multivitamin with minerals) tablet, Take 1 tablet by mouth daily, Disp: , Rfl:     NON FORMULARY, Dim plus estrogen metabolism, Disp: , Rfl:     Cholecalciferol (Vitamin D) 50 MCG (2000 UT) tablet, Take 2,000 Units by mouth daily (Patient not taking: Reported on 7/12/2023), Disp: , Rfl:   Allergies   Allergen Reactions    Penicillins Anaphylaxis and Hives    Cephalexin         Labs:  Hospital Outpatient Visit on 12/29/2023   Component Date Value    MV mean gradient retrogr* 12/29/2023 85     Triscuspid Valve Regurgi* 12/29/2023 22.0     RAA A4C 12/29/2023 16.4     MR PG 12/29/2023 117     LA Volume Index (BP) 12/29/2023 36.6     MV Peak A Abraham 12/29/2023 0.67     MV stenosis pressure 1/2* 12/29/2023 37     MV Peak E Abraham 12/29/2023 70     E wave deceleration time 12/29/2023 129     E/A ratio 12/29/2023 1.04     MV valve area p 1/2 meth* 12/29/2023 5.95     TR Peak Abraham 12/29/2023 2.3     RVID d 12/29/2023 3.6     A4C EF 12/29/2023 63     Mitral regurgitation pea* 12/29/2023 5.41     Mitral valve mean inflow* 12/29/2023 4.28     Tricuspid valve peak reg* 12/29/2023 2.32     Left ventricular stroke * 12/29/2023 78.00     IVSd 12/29/2023 0.90     Tricuspid annular plane * 12/29/2023 3.00     Ao root 12/29/2023 3.60     LVPWd 12/29/2023 0.90     LA size 12/29/2023 3.1     Asc Ao 12/29/2023 3.2     LA volume (BP) 12/29/2023 63     FS 12/29/2023 36     LVIDS 12/29/2023 3.20     IVS 12/29/2023 0.9     LVIDd 12/29/2023 5.00     LA length (A2C) 12/29/2023 5.30     MV VTI RETROGRADE 12/29/2023 55.4     LEFT VENTRICLE SYSTOLIC * 12/29/2023 42     LV DIASTOLIC VOLUME (MOD* 12/29/2023 120     Left Atrium Area-systoli* 12/29/2023 19.2     Left Atrium Area-systoli* 12/29/2023 22.6     MV E' Tissue Velocity La* 12/29/2023 14     MV E' Tissue Velocity Se* 12/29/2023 11     LVSV, 2D 12/29/2023 78     LV EF 12/29/2023 55     Est. RA pres 12/29/2023 10.0     Right Ventricular Peak S* 12/29/2023 32.00    Appointment on 12/27/2023   Component Date Value    TSH 3RD GENERATON 12/27/2023 4.103     WBC 12/27/2023 5.01     RBC 12/27/2023 3.95     Hemoglobin 12/27/2023 11.5     Hematocrit 12/27/2023 33.7 (L)     MCV 12/27/2023 85     MCH 12/27/2023 29.1     MCHC 12/27/2023 34.1     RDW 12/27/2023 15.4 (H)     Platelets 12/27/2023 148 (L)     MPV 12/27/2023 11.0     Cholesterol 12/27/2023 137     Triglycerides  12/27/2023 53     HDL, Direct 12/27/2023 68     LDL Calculated 12/27/2023 58     Non-HDL-Chol (CHOL-HDL) 12/27/2023 69     Sodium 12/27/2023 143     Potassium 12/27/2023 4.3     Chloride 12/27/2023 106     CO2 12/27/2023 28     ANION GAP 12/27/2023 9     BUN 12/27/2023 16     Creatinine 12/27/2023 0.63     Glucose, Fasting 12/27/2023 98     Calcium 12/27/2023 9.2     AST 12/27/2023 21     ALT 12/27/2023 22     Alkaline Phosphatase 12/27/2023 49     Total Protein 12/27/2023 6.3 (L)     Albumin 12/27/2023 4.4     Total Bilirubin 12/27/2023 1.02 (H)     eGFR 12/27/2023 105     Folate 12/27/2023 >22.3     Vitamin B-12 12/27/2023 225     Hemoglobin A1C 12/27/2023 4.2     EAG 12/27/2023 74     Iron Saturation 12/27/2023 23     TIBC 12/27/2023 247 (L)     Iron 12/27/2023 56     UIBC 12/27/2023 191     Ferritin 12/27/2023 64    Office Visit on 12/21/2023   Component Date Value    Ventricular Rate 12/21/2023 64     Atrial Rate 12/21/2023 64     IN Interval 12/21/2023 132     QRSD Interval 12/21/2023 88     QT Interval 12/21/2023 438     QTC Interval 12/21/2023 451     P Axis 12/21/2023 59     QRS [place] 12/21/2023 31     T Wave Axis 12/21/2023 29      Imaging: Holter monitor    Result Date: 1/16/2024  Narrative: PATIENT NAME:  Janiya Valle AGE:  48 y.o.       Sex:  female MRN:  [id number] PROCEDURE: Holter monitor - 48 hour INDICATIONS: Palpitations HOLTER FINDINGS: The patient was monitored for 48 continuous hours.  This revealed the patient to be in sinus rhythm with an average rate of 66 BPM; a minimum rate of 44 BPM; and a maximum rate of 120 BPM. There were a total of 284 premature ventricular contractions.  There were no ventricular runs or arrhythmias. There were a total of 116 premature atrial contractions.  There were 2 atrial couplets, and 2 short runs of nonsustained atrial tachycardia, both of these were 5 beats.  There was no evidence of atrial fibrillation or atrial flutter.  There was approximately 21.5 hours  of bradycardia. There was no evidence of heart block, and no significant pauses were seen.  There was approximately 1.5 hours of tachycardia. This was all sinus tachycardia. Symptoms were reviewed and patient experienced a few episodes of palpitations and an episode of shortness of breath, that did not correspond with any significant findings.     Impression: The patient was in Sinus rhythm throughout the duration of the study. The average heart rate was 66 bpm. Occasional APCs and PACs There were 2 short runs of nonsustained atrial tachycardia, both lasting 5 beats. No sustained arrhythmias were noted. Symptoms as reported above that did not correspond with any significant findings.       Review of Systems:  Review of Systems   Constitutional:  Negative for activity change, fatigue and fever.   HENT:  Negative for hearing loss and nosebleeds.    Eyes:  Negative for visual disturbance.   Respiratory:  Positive for shortness of breath. Negative for apnea, wheezing and stridor.    Cardiovascular:  Positive for chest pain and palpitations. Negative for leg swelling.   Endocrine: Negative for cold intolerance and heat intolerance.   Musculoskeletal:  Negative for arthralgias and gait problem.   Allergic/Immunologic: Negative for immunocompromised state.   Neurological:  Negative for dizziness, syncope, speech difficulty, weakness and light-headedness.   Hematological:  Does not bruise/bleed easily.   Psychiatric/Behavioral:  Negative for sleep disturbance. The patient is not nervous/anxious.        Physical Exam:  Physical Exam  Constitutional:       General: She is not in acute distress.     Appearance: Normal appearance. She is normal weight. She is not ill-appearing, toxic-appearing or diaphoretic.   HENT:      Head: Normocephalic.   Eyes:      General: No scleral icterus.     Conjunctiva/sclera: Conjunctivae normal.   Neck:      Vascular: No carotid bruit.   Cardiovascular:      Rate and Rhythm: Normal rate and  regular rhythm.      Pulses: Normal pulses.      Heart sounds: No murmur (no midsystolic clicks, no changes with isometric maneuvers or respiratory maneuvers.) heard.     No friction rub. No gallop.   Pulmonary:      Effort: Pulmonary effort is normal. No respiratory distress.      Breath sounds: Normal breath sounds. No stridor. No wheezing, rhonchi or rales.   Abdominal:      General: Abdomen is flat. Bowel sounds are normal.      Palpations: Abdomen is soft.      Tenderness: There is no abdominal tenderness.   Musculoskeletal:      Right lower leg: No edema.      Left lower leg: No edema.   Skin:     General: Skin is warm and dry.      Capillary Refill: Capillary refill takes less than 2 seconds.      Coloration: Skin is not jaundiced or pale.      Findings: No bruising or erythema.   Neurological:      Mental Status: She is alert and oriented to person, place, and time.   Psychiatric:         Mood and Affect: Mood normal.         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  Most recent echocardiogram 2022 revealed normal left ventricular systolic and diastolic function.  There was mild left atrial enlargement and mild to moderate mitral insufficiency.  Posterior mitral leaflets prolapsing.  There is no description of myxomatous changes.  Mild tricuspid insufficiency with estimated normal pulmonary pressures suggested by Doppler criteria.  Echocardiogram from 4 years prior suggest only mild mitral sufficiency.  Clinically appears to be unchanged.  Personally reviewed echocardiogram my feeling that there is no significant change noted.  Symptoms consistent with mitral valve prolapse.  Will do a 2-week monitor, recent 4-hour Holter monitor revealed no sustained tachyarrhythmias.  Symptoms without associated arrhythmias, there was a short runs of nonsustained atrial tachycardia noted.  Natural history of valvular disease were explained to the patient.   At the present time no interventions.  No antibiotic prophylaxis needed..    This note was completed in part utilizing [employer] direct voice recognition software.   Grammatical errors, random word insertion, spelling mistakes, and incomplete sentences may be an occasional consequence of the system secondary to software limitations, ambient noise and hardware issues. At the time of dictation, efforts were made to edit, clarify and /or correct errors.  Please read the chart carefully and recognize, using context, where substitutions have occurred.  If you have any questions or concerns about the context, text or information contained within the body of this dictation, please contact myself, the provider, for further clarification.

## 2024-02-08 ENCOUNTER — TELEPHONE (OUTPATIENT)
Dept: CARDIOLOGY CLINIC | Facility: CLINIC | Age: 49
End: 2024-02-08

## 2024-02-08 NOTE — TELEPHONE ENCOUNTER
2 wk Shay ordered per Dr. Kline. Is a prior auth needed for pt?    [No Acute Distress] : no acute distress [Normal Oropharynx] : normal oropharynx [Normal Appearance] : normal appearance [No Neck Mass] : no neck mass [Normal Rate/Rhythm] : normal rate/rhythm [Normal S1, S2] : normal s1, s2 [No Murmurs] : no murmurs [No Resp Distress] : no resp distress [Clear to Auscultation Bilaterally] : clear to auscultation bilaterally [No Abnormalities] : no abnormalities [Benign] : benign [Normal Gait] : normal gait [No Clubbing] : no clubbing [No Cyanosis] : no cyanosis [No Edema] : no edema [FROM] : FROM [Normal Color/ Pigmentation] : normal color/ pigmentation [No Focal Deficits] : no focal deficits [Oriented x3] : oriented x3 [Normal Affect] : normal affect [TextBox_2] : Obese female no acute respiratory distress

## 2024-02-08 NOTE — TELEPHONE ENCOUNTER
Per [facility] online precert resource list auth is not required for a 2W [id number] XT or a 1W [id number] XT

## 2024-03-13 ENCOUNTER — TELEPHONE (OUTPATIENT)
Dept: CARDIOLOGY CLINIC | Facility: CLINIC | Age: 49
End: 2024-03-13

## 2024-03-13 ENCOUNTER — HOSPITAL ENCOUNTER (OUTPATIENT)
Dept: NON INVASIVE DIAGNOSTICS | Facility: CLINIC | Age: 49
Discharge: HOME | End: 2024-03-13
Payer: COMMERCIAL

## 2024-03-13 ENCOUNTER — CLINICAL SUPPORT (OUTPATIENT)
Dept: CARDIOLOGY CLINIC | Facility: CLINIC | Age: 49
End: 2024-03-13
Payer: COMMERCIAL

## 2024-03-13 VITALS
OXYGEN SATURATION: 98 % | WEIGHT: 132 LBS | BODY MASS INDEX: 20.72 KG/M2 | HEART RATE: 72 BPM | SYSTOLIC BLOOD PRESSURE: 118 MMHG | HEIGHT: 67 IN | DIASTOLIC BLOOD PRESSURE: 74 MMHG

## 2024-03-13 DIAGNOSIS — I34.0 NONRHEUMATIC MITRAL VALVE REGURGITATION: ICD-10-CM

## 2024-03-13 DIAGNOSIS — R07.9 CHEST PAIN, UNSPECIFIED TYPE: ICD-10-CM

## 2024-03-13 DIAGNOSIS — R00.2 PALPITATION: Primary | ICD-10-CM

## 2024-03-13 DIAGNOSIS — R00.2 PALPITATION: ICD-10-CM

## 2024-03-13 LAB
MAX HR PERCENT: 86 %
MAX HR: 146 BPM
RATE PRESSURE PRODUCT: NORMAL
SL CV STRESS RECOVERY BP: NORMAL MMHG
SL CV STRESS RECOVERY HR: 78 BPM
SL CV STRESS RECOVERY O2 SAT: 99 %
SL CV STRESS STAGE REACHED: 4
STRESS ANGINA INDEX: 0
STRESS BASELINE BP: NORMAL MMHG
STRESS BASELINE HR: 72 BPM
STRESS O2 SAT REST: 98 %
STRESS PEAK HR: 146 BPM
STRESS POST ESTIMATED WORKLOAD: 13.4 METS
STRESS POST EXERCISE DUR MIN: 12 MIN
STRESS POST EXERCISE DUR SEC: 0 SEC
STRESS POST O2 SAT PEAK: 99 %
STRESS POST PEAK BP: 150 MMHG

## 2024-03-13 PROCEDURE — 93018 CV STRESS TEST I&R ONLY: CPT | Performed by: INTERNAL MEDICINE

## 2024-03-13 PROCEDURE — 93017 CV STRESS TEST TRACING ONLY: CPT

## 2024-03-13 PROCEDURE — 93248 EXT ECG>7D<15D REV&INTERPJ: CPT | Performed by: INTERNAL MEDICINE

## 2024-03-13 PROCEDURE — 93016 CV STRESS TEST SUPVJ ONLY: CPT | Performed by: INTERNAL MEDICINE

## 2024-03-13 NOTE — TELEPHONE ENCOUNTER
----- Message from Mark Kline MD sent at 3/13/2024 12:50 PM EDT -----  Please call the patient regarding her abnormal result.  Mild arrhythmia favor metoprolol tartrate 25 mg p.o. daily as needed

## 2024-03-14 LAB
CHEST PAIN STATEMENT: NORMAL
MAX DIASTOLIC BP: 86 MMHG
MAX PREDICTED HEART RATE: 171 BPM
PROTOCOL NAME: NORMAL
REASON FOR TERMINATION: NORMAL
STRESS POST EXERCISE DUR MIN: 12 MIN
STRESS POST EXERCISE DUR SEC: 0 SEC
STRESS POST PEAK HR: 148 BPM
STRESS POST PEAK SYSTOLIC BP: 150 MMHG
TARGET HR FORMULA: NORMAL
TEST INDICATION: NORMAL

## 2024-04-15 ENCOUNTER — OFFICE VISIT (OUTPATIENT)
Dept: OBGYN CLINIC | Facility: CLINIC | Age: 49
End: 2024-04-15
Payer: COMMERCIAL

## 2024-04-15 VITALS
BODY MASS INDEX: 21.57 KG/M2 | SYSTOLIC BLOOD PRESSURE: 110 MMHG | WEIGHT: 134.2 LBS | HEIGHT: 66 IN | DIASTOLIC BLOOD PRESSURE: 72 MMHG

## 2024-04-15 DIAGNOSIS — R00.2 PALPITATION: ICD-10-CM

## 2024-04-15 DIAGNOSIS — N95.1 PERIMENOPAUSE: Primary | ICD-10-CM

## 2024-04-15 DIAGNOSIS — D58.0 HEREDITARY SPHEROCYTOSIS (HCC): ICD-10-CM

## 2024-04-15 DIAGNOSIS — R23.2 HOT FLASHES: ICD-10-CM

## 2024-04-15 PROCEDURE — 99214 OFFICE O/P EST MOD 30 MIN: CPT | Performed by: OBSTETRICS & GYNECOLOGY

## 2024-04-15 RX ORDER — PAROXETINE 7.5 MG/1
7.5 CAPSULE ORAL DAILY
Qty: 30 CAPSULE | Refills: 1 | Status: SHIPPED | OUTPATIENT
Start: 2024-04-15

## 2024-04-15 NOTE — PROGRESS NOTES
Assessment/Plan:  Discussed treatment options for vasomotor symptoms including conservative over-the-counter agents, SSRI agents, hormonal treatment.  Risks and benefits reviewed.  At this point she would like to try SSRI, Marito dispense 30 tablets with 1 refill.  She will keep a menstrual diary as well as vasomotor symptoms.  All questions answered.  She will call with update in 6 weeks.  Return to office 2024 for annual visit or as needed  No problem-specific Assessment & Plan notes found for this encounter.       Diagnoses and all orders for this visit:    Perimenopause    Palpitation    Hereditary spherocytosis (HCC)    Hot flashes  -     PARoxetine Mesylate 7.5 MG CAPS; Take 1 capsule (7.5 mg total) by mouth in the morning    Other orders  -     Ferrous Sulfate (IRON PO); Take by mouth  -     Ascorbic Acid (VITAMIN C PO); Take by mouth          Subjective:      Patient ID: Janiya Valle is a 48 y.o. female.    HPI    This is a pleasant 48-year-old female P2 ( x 2, age 16, 11) presents complaining of hot flashes, night sweats, insomnia over the last several months. LMP 2/14/24 x 4 days, 23.  She was having palpitations 1 week prior to menses which seem to have improved January/February.  She underwent cardiac workup which was essentially normal including EKG, Holter monitor.  She was given a beta-blocker as needed for palpitations.  She denies any breast tenderness.    She started an over-the-counter oral supplement containing soy as well as progesterone cream nightly over the last 2 weeks.  She is uncertain whether there is any improvement in symptoms.    Was on Loestrin  discontinued 2022 for mood swings irritability.    The following portions of the patient's history were reviewed and updated as appropriate: allergies, current medications, past family history, past medical history, past social history, past surgical history, and problem list.    Review of Systems   Constitutional:   "Negative for fatigue, fever and unexpected weight change.   Respiratory:  Negative for cough, chest tightness, shortness of breath and wheezing.    Cardiovascular: Negative.  Negative for chest pain and palpitations.   Gastrointestinal: Negative.  Negative for abdominal distention, abdominal pain, blood in stool, constipation, diarrhea, nausea and vomiting.   Genitourinary: Negative.  Negative for difficulty urinating, dyspareunia, dysuria, flank pain, frequency, genital sores, hematuria, pelvic pain, urgency, vaginal bleeding, vaginal discharge and vaginal pain.   Skin:  Negative for rash.         Objective:      /72   Ht 5' 6\" (1.676 m)   Wt 60.9 kg (134 lb 3.2 oz)   LMP 02/14/2024 (Approximate)   BMI 21.66 kg/m²          Physical Exam      "

## 2024-05-31 ENCOUNTER — OFFICE VISIT (OUTPATIENT)
Dept: FAMILY MEDICINE CLINIC | Facility: CLINIC | Age: 49
End: 2024-05-31
Payer: COMMERCIAL

## 2024-05-31 VITALS
DIASTOLIC BLOOD PRESSURE: 80 MMHG | SYSTOLIC BLOOD PRESSURE: 130 MMHG | OXYGEN SATURATION: 98 % | TEMPERATURE: 97.6 F | HEART RATE: 68 BPM

## 2024-05-31 DIAGNOSIS — E55.9 VITAMIN D DEFICIENCY: ICD-10-CM

## 2024-05-31 DIAGNOSIS — Z00.00 ANNUAL PHYSICAL EXAM: Primary | ICD-10-CM

## 2024-05-31 DIAGNOSIS — D64.9 ANEMIA, UNSPECIFIED TYPE: ICD-10-CM

## 2024-05-31 DIAGNOSIS — G47.09 OTHER INSOMNIA: ICD-10-CM

## 2024-05-31 DIAGNOSIS — Z12.31 ENCOUNTER FOR SCREENING MAMMOGRAM FOR BREAST CANCER: ICD-10-CM

## 2024-05-31 DIAGNOSIS — Z12.12 SCREENING FOR COLORECTAL CANCER: ICD-10-CM

## 2024-05-31 DIAGNOSIS — Z12.11 SCREENING FOR COLORECTAL CANCER: ICD-10-CM

## 2024-05-31 DIAGNOSIS — R00.2 PALPITATION: ICD-10-CM

## 2024-05-31 DIAGNOSIS — Z23 ENCOUNTER FOR IMMUNIZATION: ICD-10-CM

## 2024-05-31 PROCEDURE — 90471 IMMUNIZATION ADMIN: CPT

## 2024-05-31 PROCEDURE — 99213 OFFICE O/P EST LOW 20 MIN: CPT | Performed by: FAMILY MEDICINE

## 2024-05-31 PROCEDURE — 90715 TDAP VACCINE 7 YRS/> IM: CPT

## 2024-05-31 PROCEDURE — 99396 PREV VISIT EST AGE 40-64: CPT | Performed by: FAMILY MEDICINE

## 2024-05-31 RX ORDER — TRAZODONE HYDROCHLORIDE 50 MG/1
50 TABLET ORAL
Qty: 30 TABLET | Refills: 0 | Status: SHIPPED | OUTPATIENT
Start: 2024-05-31

## 2024-05-31 NOTE — PROGRESS NOTES
Adult Annual Physical  Name: Janiya Valle      : 1975      MRN: [id number]  Encounter Provider: Gladys Herrmann DO  Encounter Date: 2024   Encounter department: [facility]    Assessment & Plan   1. Annual physical exam  2. Encounter for screening mammogram for breast cancer  -     Mammo screening bilateral w 3d & cad; Future  3. Screening for colorectal cancer  -     Ambulatory referral to Gastroenterology; Future  4. Encounter for immunization  -     TDAP VACCINE GREATER THAN OR EQUAL TO 8YO IM  5. Other insomnia  Assessment & Plan:  Has tried multiple OTC medications. Taking Magnesium. Will start on Trazodone 25-50 mg HS prn. Follow up in one month.  Orders:  -     traZODone (DESYREL) 50 mg tablet; Take 1 tablet (50 mg total) by mouth daily at bedtime as needed for sleep  6. Palpitation  Assessment & Plan:  Saw Cardiology.  7. Anemia, unspecified type  -     Iron Panel (Includes Ferritin, Iron Sat%, Iron, and TIBC); Future  8. Vitamin D deficiency  -     Vitamin D 25 hydroxy; Future  Immunizations and preventive care screenings were discussed with patient today. Appropriate education was printed on patient's after visit summary.    Counseling:  Alcohol/drug use: discussed moderation in alcohol intake, the recommendations for healthy alcohol use, and avoidance of illicit drug use.  Dental Health: discussed importance of regular tooth brushing, flossing, and dental visits.  Injury prevention: discussed safety/seat belts, safety helmets, smoke detectors, carbon dioxide detectors, and smoking near bedding or upholstery.  Sexual health: discussed sexually transmitted diseases, partner selection, use of condoms, avoidance of unintended pregnancy, and contraceptive alternatives.  Exercise: the importance of regular exercise/physical activity was discussed. Recommend exercise 3-5 times per week for at least 30 minutes.          History of Present Illness       Having hot flashes, no  period for three months. Saw GYN last month, started on Paroxetine, developed palpitations, felt off on it so stopped taking it. Was taking Benadryl but no longer. Black Cohosh helps her hot flashes. Still not sleeping well. Feels tired but cannot fall asleep.       Adult Annual Physical:  Patient presents for annual physical.     Diet and Physical Activity:  - Diet/Nutrition: well balanced diet.  - Exercise: 5-7 times a week on average, moderate cardiovascular exercise and vigorous cardiovascular exercise.    General Health:  - Sleep: sleeps well.  - Hearing: normal hearing bilateral ears.  - Vision: no vision problems and goes for regular eye exams.    /GYN Health:  - Follows with GYN: yes.     Review of Systems      Objective     /80   Pulse 68   Temp 97.6 °F (36.4 °C) (Temporal)   SpO2 98%     Physical Exam  Vitals reviewed.   Constitutional:       Appearance: Normal appearance.   HENT:      Head: Normocephalic and atraumatic.      Right Ear: External ear normal.      Left Ear: External ear normal.      Nose: Nose normal.      Mouth/Throat:      Mouth: Mucous membranes are moist.      Pharynx: Oropharynx is clear.   Eyes:      Extraocular Movements: Extraocular movements intact.      Conjunctiva/sclera: Conjunctivae normal.   Cardiovascular:      Rate and Rhythm: Normal rate and regular rhythm.      Heart sounds: Normal heart sounds.   Pulmonary:      Effort: Pulmonary effort is normal.      Breath sounds: Normal breath sounds.   Abdominal:      General: Abdomen is flat.   Skin:     General: Skin is warm and dry.   Neurological:      Mental Status: She is alert.   Psychiatric:         Mood and Affect: Mood normal.         Behavior: Behavior normal.       Administrative Statements

## 2024-05-31 NOTE — ASSESSMENT & PLAN NOTE
Has tried multiple OTC medications. Taking Magnesium. Will start on Trazodone 25-50 mg HS prn. Follow up in one month.

## 2024-06-15 DIAGNOSIS — G47.09 OTHER INSOMNIA: ICD-10-CM

## 2024-06-15 RX ORDER — TRAZODONE HYDROCHLORIDE 50 MG/1
50 TABLET ORAL
Qty: 30 TABLET | Refills: 0 | Status: SHIPPED | OUTPATIENT
Start: 2024-06-15

## 2024-07-10 ENCOUNTER — ANNUAL EXAM (OUTPATIENT)
Dept: OBGYN CLINIC | Facility: CLINIC | Age: 49
End: 2024-07-10
Payer: COMMERCIAL

## 2024-07-10 VITALS
SYSTOLIC BLOOD PRESSURE: 112 MMHG | DIASTOLIC BLOOD PRESSURE: 68 MMHG | WEIGHT: 136.6 LBS | HEIGHT: 67 IN | BODY MASS INDEX: 21.44 KG/M2

## 2024-07-10 DIAGNOSIS — Z01.419 ENCOUNTER FOR ANNUAL ROUTINE GYNECOLOGICAL EXAMINATION: Primary | ICD-10-CM

## 2024-07-10 DIAGNOSIS — N95.1 PERIMENOPAUSE: ICD-10-CM

## 2024-07-10 DIAGNOSIS — Z12.31 ENCOUNTER FOR SCREENING MAMMOGRAM FOR MALIGNANT NEOPLASM OF BREAST: ICD-10-CM

## 2024-07-10 DIAGNOSIS — K42.9 UMBILICAL HERNIA WITHOUT OBSTRUCTION AND WITHOUT GANGRENE: ICD-10-CM

## 2024-07-10 PROCEDURE — S0612 ANNUAL GYNECOLOGICAL EXAMINA: HCPCS | Performed by: OBSTETRICS & GYNECOLOGY

## 2024-07-10 NOTE — PROGRESS NOTES
Ambulatory Visit  Name: Janiya Valle      : 1975      MRN: [id number]  Encounter Provider: Adenike Felipe DO  Encounter Date: 7/10/2024   Encounter department: [facility]    Assessment & Plan   1. Encounter for annual routine gynecological examination  2. Encounter for screening mammogram for malignant neoplasm of breast    Pap done as well as annual.  Encouraged self breast examination as well as calcium supplementation.  Continue annual mammogram.  Reviewed colon cancer screening, scheduled for GI consultation in the near future.  Reviewed signs and symptoms of perimenopause.  She will keep menstrual diary.  Recommend consultation for possible umbilical hernia.  Names were provided for general surgeons.  All questions answered.  Return to office in 1 year or as needed    History of Present Illness     Janiya Valle is a 49 y.o. female who presents     This is a pleasant 49-year-old female P2 ( x 2, age 17, 12) presents for her GYN exam.  Her cycles have been irregular.  Last menstrual cycle 2024.  She states her hot flashes have improved.  She did try taking Paxil and after 1 dose had significant palpitations.  She discontinued.  She is now taking trazodone half a tablet every night and is sleeping better.  She denies any changes in bowel or bladder function.  She has been in a monogamous relationship for over 24 years.  Pap smears have been normal.    Patient expresses concerns regarding possible umbilical hernia.  She states that after her last delivery she felt like she had a weakness superior to the umbilicus.  Patient does exercise daily, very physical.  She has noticed discomfort in this location during physical activity.    She does follow-up with primary care and dermatology once a year.    Mammo 2023    Colon consult GI for first colonoscopy    Pap      Review of Systems   Constitutional:  Negative for fatigue, fever and unexpected weight change.   Respiratory:   "Negative for cough, chest tightness, shortness of breath and wheezing.    Cardiovascular: Negative.  Negative for chest pain and palpitations.   Gastrointestinal: Negative.  Negative for abdominal distention, abdominal pain, blood in stool, constipation, diarrhea, nausea and vomiting.   Genitourinary: Negative.  Negative for difficulty urinating, dyspareunia, dysuria, flank pain, frequency, genital sores, hematuria, pelvic pain, urgency, vaginal bleeding, vaginal discharge and vaginal pain.   Skin:  Negative for rash.     Current Outpatient Medications on File Prior to Visit   Medication Sig Dispense Refill    Ascorbic Acid (VITAMIN C PO) Take by mouth      Ferrous Sulfate (IRON PO) Take by mouth      folic acid (FOLVITE) 1 mg tablet Take 1 tablet (1 mg total) by mouth daily 90 tablet 3    metoprolol tartrate (LOPRESSOR) 25 mg tablet Take one tablet daily as needed for palpitations 30 tablet 6    Multiple Vitamins-Minerals (multivitamin with minerals) tablet Take 1 tablet by mouth daily      NON FORMULARY Dim plus estrogen metabolism      traZODone (DESYREL) 50 mg tablet TAKE ONE TABLET BY MOUTH AT BEDTIME AS NEEDED FOR SLEEP 30 tablet 0    Cholecalciferol (Vitamin D) 50 MCG (2000 UT) tablet Take 2,000 Units by mouth daily (Patient not taking: Reported on 7/12/2023)       No current facility-administered medications on file prior to visit.      Objective     /68   Ht 5' 7\" (1.702 m)   Wt 62 kg (136 lb 9.6 oz)   LMP 02/15/2024 (Approximate)   BMI 21.39 kg/m²     Physical Exam  Constitutional:       Appearance: Normal appearance. She is well-developed.   HENT:      Head: Normocephalic and atraumatic.   Cardiovascular:      Rate and Rhythm: Normal rate and regular rhythm.   Pulmonary:      Effort: Pulmonary effort is normal.      Breath sounds: Normal breath sounds.   Chest:   Breasts:     Right: No inverted nipple, mass, nipple discharge, skin change or tenderness.      Left: No inverted nipple, mass, nipple " discharge, skin change or tenderness.   Abdominal:      General: Bowel sounds are normal. There is no distension.      Palpations: Abdomen is soft.      Tenderness: There is no abdominal tenderness. There is no guarding or rebound.   Genitourinary:     Labia:         Right: No rash, tenderness or lesion.         Left: No rash, tenderness or lesion.       Vagina: Normal. No signs of injury. No vaginal discharge or tenderness.      Cervix: No cervical motion tenderness, discharge, friability, lesion or cervical bleeding.      Uterus: Not enlarged and not tender.       Adnexa:         Right: No mass, tenderness or fullness.          Left: No mass, tenderness or fullness.     Neurological:      Mental Status: She is alert.   Psychiatric:         Behavior: Behavior normal.     With flexion at abdomen, I am unable to palpate an obvious defect.  She appears slightly tender superior to the umbilicus, questionable diastases versus hernia.  Will refer to general surgery for second opinion.      Administrative Statements   I have spent a total time of 30 minutes in caring for this patient on the day of the visit/encounter including Impressions, Counseling / Coordination of care, Documenting in the medical record, Reviewing / ordering tests, medicine, procedures  , and Obtaining or reviewing history  .

## 2024-07-23 ENCOUNTER — CONSULT (OUTPATIENT)
Dept: GASTROENTEROLOGY | Facility: CLINIC | Age: 49
End: 2024-07-23
Payer: COMMERCIAL

## 2024-07-23 VITALS
TEMPERATURE: 98 F | WEIGHT: 132 LBS | HEIGHT: 67 IN | SYSTOLIC BLOOD PRESSURE: 117 MMHG | BODY MASS INDEX: 20.72 KG/M2 | DIASTOLIC BLOOD PRESSURE: 77 MMHG

## 2024-07-23 DIAGNOSIS — Z12.12 SCREENING FOR COLORECTAL CANCER: ICD-10-CM

## 2024-07-23 DIAGNOSIS — Z11.59 NEED FOR HEPATITIS C SCREENING TEST: Primary | ICD-10-CM

## 2024-07-23 DIAGNOSIS — Z12.11 SCREENING FOR COLORECTAL CANCER: ICD-10-CM

## 2024-07-23 PROCEDURE — 99244 OFF/OP CNSLTJ NEW/EST MOD 40: CPT | Performed by: INTERNAL MEDICINE

## 2024-07-23 RX ORDER — POLYETHYLENE GLYCOL 3350 17 G/17G
238 POWDER, FOR SOLUTION ORAL ONCE
Qty: 238 G | Refills: 0 | Status: SHIPPED | OUTPATIENT
Start: 2024-07-23 | End: 2024-07-23

## 2024-07-23 RX ORDER — BISACODYL 5 MG/1
10 TABLET, DELAYED RELEASE ORAL ONCE
Qty: 2 TABLET | Refills: 0 | Status: SHIPPED | OUTPATIENT
Start: 2024-07-23 | End: 2024-07-23

## 2024-07-23 NOTE — PROGRESS NOTES
[facility]  Outpatient Consult  Encounter: [id number]    PATIENT INFO     Name: Janiya Valle  YOB: 1975   Age: 49 y.o.   Sex: female   MRN: [id number]    ASSESSMENT & PLAN     Janiya Valle is a 49 y.o. female with past medical history significant for Mitral valve regurgitation/mitral valve prolapse, hereditary spherocytosis with splenomegaly, and insomnia who presents to GI office for discussion of colorectal cancer screening    Diagnoses and all orders for this visit:    Need for hepatitis C screening test  Discussed screening for hepatitis C as one time test.  Patient agreeable and orders placed  -     Hepatitis C antibody; Future    Screening for colorectal cancer  Patient presenting for discussion of colon cancer screening. Reports no symptoms or family history of colon cancer. Dad with history of colon polyps. HX of constipation as a child, which is now resolved and she moves her bowels regularly. Denies any GI complaints at current time  Will plan for colonoscopy with MiraLAX and Dulcolax prep  Clear liquid diet day prior to procedure  Discussed procedure, risks/benefits, need for transportation with patient  Follow-up in office as needed after procedure  -     Ambulatory referral to Gastroenterology  -     Colonoscopy; Future  -     polyethylene glycol (MiraLax) 17 GM/SCOOP powder; Take 238 g by mouth once for 1 dose Take 238 g my mouth. Use as directed  -     bisacodyl (DULCOLAX) 5 mg EC tablet; Take 2 tablets (10 mg total) by mouth 1 (one) time for 1 dose    Hereditary Spherocytosis  Patient with history of anemia due to spherocytosis. She also endorses associated splenomegaly. Recent CBC without anemia and last Iron studies - ferritin 64, iron 56, iron saturation 23%, TIBC 247 with normal CBC.  PCP has ordered repeat labs.  Discussed risk of splenic laceration with colonoscopy - very low risk but will take extra caution in her case    FOLLOW-UP:  PRN    HISTORY OF PRESENT ILLNESS       Janiya Valle is a 49 y.o. female with past medical history significant for Mitral valve regurgitation/mitral valve prolapse, hereditary spherocytosis with splenomegaly, and insomnia who presents to GI office for discussion of colorectal cancer screening    Does endorse history of constipation as a child and she would take medication nightly and use enemas. Grew out of that. Diagnosed with IBS due to abdominal discomfort with certain foods. Stress, coffee triggers. Lower abdominal discomfort. Has not had in many years. Altered life style and is significantly improved. Moves bowels daily without straining as long as she keeps to her diet and water intake.     Patient is a runner - when she runs on empty stomach or with coffee and would notice some bloating.   Denies nausea, vomiting, GERD, dysphagia  Hernia after childbirth - midline above belly button. Not incarcerated, but becoming more bothersome    Patient does have history of isolated total and indirect bili elevation  Father with history of colon polyps  No family history of colon cancer       ENDOSCOPIC HISTORY     UPPER ENDOSCOPY: none prior  COLONOSCOPY: none prior    REVIEW OF SYSTEMS     CONSTITUTIONAL: Denies any fever, chills, rigors, and weight loss  HEENT: No earache or tinnitus, denies hearing loss or visual disturbances  CARDIOVASCULAR: No chest pain or palpitations  RESPIRATORY: Denies any cough, hemoptysis, shortness of breath or dyspnea on exertion  GASTROINTESTINAL: As noted in the History of Present Illness  GENITOURINARY: No problems with urination, denies any hematuria or dysuria  NEUROLOGIC: No dizziness or vertigo, denies headaches   MUSCULOSKELETAL: Denies any muscle or joint pain   SKIN: Denies skin rashes or itching  ENDOCRINE: Denies excessive thirst, denies intolerance to heat or cold  PSYCHOSOCIAL: Denies depression or anxiety, denies any recent memory loss     Historical Information   Past  "Medical History:   Diagnosis Date    Allergic     Anemia     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 (HCC)     Mitral valve prolapse      Past Surgical History:   Procedure Laterality Date    BREAST BIOPSY Left 2017    DENTAL SURGERY      US GUIDED BREAST BIOPSY LEFT COMPLETE Left 7/31/2017     Social History   Social History     Substance and Sexual Activity   Alcohol Use Yes    Alcohol/week: 2.0 standard drinks of alcohol    Types: 2 Glasses of wine per week     Social History     Substance and Sexual Activity   Drug Use No     Social History     Tobacco Use   Smoking Status Former   Smokeless Tobacco Never     Family History   Problem Relation Age of Onset    No Known Problems Mother     No Known Problems Father     No Known Problems Sister     No Known Problems Sister     No Known Problems Daughter     No Known Problems Daughter     No Known Problems Maternal Grandmother     No Known Problems Maternal Grandfather     No Known Problems Paternal Grandmother     No Known Problems Paternal Grandfather     No Known Problems Paternal Aunt     Hypertension Family     Gout Family     Coronary artery disease Family     Hereditary spherocytosis Family          MEDICATIONS AND ALLERGIES     Current Outpatient Medications   Medication Instructions    Ascorbic Acid (VITAMIN C PO) Oral    Ferrous Sulfate (IRON PO) Oral    folic acid (FOLVITE) 1 mg, Oral, Daily    metoprolol tartrate (LOPRESSOR) 25 mg tablet Take one tablet daily as needed for palpitations    Multiple Vitamins-Minerals (multivitamin with minerals) tablet 1 tablet, Oral, Daily    NON FORMULARY Dim plus estrogen metabolism    traZODone (DESYREL) 50 mg, Oral, Daily at bedtime PRN    Vitamin D 2,000 Units, Oral, Daily     Allergies   Allergen Reactions    Penicillins Anaphylaxis and Hives    Cephalexin        PHYSICAL EXAM      Objective   Blood pressure 117/77, temperature 98 °F (36.7 °C), temperature source Tympanic, height 5' 7\" (1.702 m), weight 59.9 kg (132 " lb), last menstrual period 02/15/2024. Body mass index is 20.67 kg/m².    General Appearance:   Alert, cooperative, no distress   HEENT:   Normocephalic, atraumatic, anicteric     Neck:   Supple, symmetrical, trachea midline   Lungs:   Equal chest rise, respirations unlabored    Heart:   Regular rate and rhythm   Abdomen:   Soft, non-tender, non-distended; normal bowel sounds; no masses, no organomegaly    Rectal:   Deferred    Extremities:   No cyanosis, clubbing or edema    Neuro:   Moves all 4 extremities    Skin:   No jaundice, rashes, or lesions      LABORATORY RESULTS     No visits with results within 1 Day(s) from this visit.   Latest known visit with results is:   Annual Exam on 07/10/2024   Component Date Value    JITENDRA JENKINS CLINICAL INFORMAT* 07/10/2024 None given     LMP 07/10/2024 None given     Prev. PAP 07/10/2024 None given     Prev. BX 07/10/2024 None given     Source 07/10/2024 Cervix     Statement of Adequacy 07/10/2024      Interpretation/Result 07/10/2024 Cytology Results: Negative for intraepithelial lesion or malignancy.     Comment 07/10/2024 This Pap test has been evaluated with computer assisted technology.     JITENDRA JENKINS CYTOTECHNOLOGIST: 07/10/2024      Comment 07/10/2024      HPV mRNA E6/E7 07/10/2024 Not Detected      No results found.    RADIOLOGY RESULTS: I have personally reviewed pertinent imaging studies.      Carmencita Mathew D.O.  Gastroenterology Fellow  [facility]  Division of Gastroenterology & Hepatology  Available on [employer]    ** Please Note: This note is constructed using a voice recognition dictation system. ** Answers submitted by the patient for this visit:  Abdominal Pain Questionnaire (Submitted on 7/23/2024)  Chief Complaint: Abdominal pain  anorexia: No  arthralgias: No  belching: No  constipation: No  diarrhea: No  dysuria: No  fever: No  flatus: No  frequency: No  headaches: No  hematochezia: No  hematuria: No  melena: No  myalgias: No  nausea:  No  weight loss: No  vomiting: No

## 2024-07-24 NOTE — PATIENT INSTRUCTIONS
Scheduled date of colonoscopy (as of today):10/23/2024  Physician performing colonoscopy:Balwinder  Location of colonoscopy:[place]   Bowel prep reviewed with patient: Miralax Dulcolax  Instructions reviewed with patient by:NR   Clearances: NONE    Patient will follow up  PRN  Labs were given to the patient to be done

## 2024-08-05 ENCOUNTER — CONSULT (OUTPATIENT)
Dept: SURGERY | Facility: CLINIC | Age: 49
End: 2024-08-05
Payer: COMMERCIAL

## 2024-08-05 ENCOUNTER — PREP FOR PROCEDURE (OUTPATIENT)
Dept: SURGERY | Facility: CLINIC | Age: 49
End: 2024-08-05

## 2024-08-05 VITALS
SYSTOLIC BLOOD PRESSURE: 115 MMHG | WEIGHT: 130 LBS | DIASTOLIC BLOOD PRESSURE: 59 MMHG | BODY MASS INDEX: 20.4 KG/M2 | HEART RATE: 60 BPM | HEIGHT: 67 IN

## 2024-08-05 DIAGNOSIS — K42.9 UMBILICAL HERNIA WITHOUT OBSTRUCTION AND WITHOUT GANGRENE: ICD-10-CM

## 2024-08-05 DIAGNOSIS — K43.9 VENTRAL HERNIA: Primary | ICD-10-CM

## 2024-08-05 DIAGNOSIS — K43.9 VENTRAL HERNIA WITHOUT OBSTRUCTION OR GANGRENE: Primary | ICD-10-CM

## 2024-08-05 DIAGNOSIS — K42.9 UMBILICAL HERNIA: ICD-10-CM

## 2024-08-05 PROCEDURE — 99243 OFF/OP CNSLTJ NEW/EST LOW 30: CPT | Performed by: SURGERY

## 2024-08-05 NOTE — LETTER
August 5, 2024     Gladys Herrmann DO  [address] [address]    Patient: Janiya Valle   YOB: 1975   Date of Visit: 8/5/2024       Dear Dr. Herrmann:    Thank you for referring Janiya Valle to me for evaluation. Below are my notes for this consultation.    If you have questions, please do not hesitate to call me. I look forward to following your patient along with you.         Sincerely,        Sánchez Rivas MD        CC: No Recipients    Sánchez Rivas MD  8/5/2024 10:27 AM  Sign when Signing Visit  Assessment/Plan: Patient presents with a ventral as well as umbilical hernia.  She is having progressive symptoms.  Has been noticeable over the last 18 years.  Is now worse with activity and improved with rest.    I recommended operative repair.  Risks and benefits explained.  All questions answered.    She has a history for hereditary spherocytosis.  She has known splenomegaly.    Diagnoses and all orders for this visit:    Umbilical hernia without obstruction and without gangrene  -     Ambulatory Referral to General Surgery        Subjective:      Patient ID: Janiya Valle is a 49 y.o. female.    Patient presents for umbilical hernia consult.  States she has had a bulge and intermittent discomfort at her umbilicus for 18 years.  Does not limit her activities.        The following portions of the patient's history were reviewed and updated as appropriate:     She  has a past medical history of Allergic, Anemia, 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 (HCC), and Mitral valve prolapse.  She  has a past surgical history that includes Dental surgery; US guided breast biopsy left complete (Left, 7/31/2017); and Breast biopsy (Left, 2017).  Her family history includes Coronary artery disease in her family; Gout in her family; Heart disease in her mother; Hereditary spherocytosis in her family; Hypertension in her family; No Known Problems in her daughter, daughter, father, maternal  grandfather, maternal grandmother, paternal aunt, paternal grandfather, paternal grandmother, sister, and sister.  She  reports that she quit smoking about 20 years ago. Her smoking use included cigarettes. She started smoking about 30 years ago. She has never used smokeless tobacco. She reports current alcohol use of about 2.0 standard drinks of alcohol per week. She reports that she does not use drugs.  Current Outpatient Medications   Medication Sig Dispense Refill   • Ascorbic Acid (VITAMIN C PO) Take by mouth     • bisacodyl (DULCOLAX) 5 mg EC tablet Take 2 tablets (10 mg total) by mouth 1 (one) time for 1 dose 2 tablet 0   • Cholecalciferol (Vitamin D) 50 MCG (2000 UT) tablet Take 2,000 Units by mouth daily     • Ferrous Sulfate (IRON PO) Take by mouth     • folic acid (FOLVITE) 1 mg tablet Take 1 tablet (1 mg total) by mouth daily 90 tablet 3   • metoprolol tartrate (LOPRESSOR) 25 mg tablet Take one tablet daily as needed for palpitations 30 tablet 6   • Multiple Vitamins-Minerals (multivitamin with minerals) tablet Take 1 tablet by mouth daily     • NON FORMULARY Dim plus estrogen metabolism     • polyethylene glycol (MiraLax) 17 GM/SCOOP powder Take 238 g by mouth once for 1 dose Take 238 g my mouth. Use as directed 238 g 0   • traZODone (DESYREL) 50 mg tablet TAKE ONE TABLET BY MOUTH AT BEDTIME AS NEEDED FOR SLEEP 30 tablet 0     No current facility-administered medications for this visit.     She is allergic to penicillins and cephalexin..    Review of Systems   Constitutional: Negative.  Negative for activity change.   HENT: Negative.     Eyes: Negative.    Respiratory: Negative.     Cardiovascular: Negative.    Gastrointestinal:  Positive for abdominal pain.   Endocrine: Negative.    Genitourinary: Negative.    Musculoskeletal: Negative.    Skin: Negative.    Allergic/Immunologic: Negative.    Neurological: Negative.    Psychiatric/Behavioral:  Negative for agitation, behavioral problems and confusion.  "The patient is not nervous/anxious.          Objective:      /59   Pulse 60   Ht 5' 7\" (1.702 m)   Wt 59 kg (130 lb)   LMP 02/15/2024 (Approximate)   BMI 20.36 kg/m²          Physical Exam  Constitutional:       Appearance: Normal appearance. She is well-developed.   HENT:      Head: Atraumatic.   Eyes:      Extraocular Movements: Extraocular movements intact.   Cardiovascular:      Rate and Rhythm: Normal rate and regular rhythm.      Heart sounds: Normal heart sounds.   Pulmonary:      Effort: Pulmonary effort is normal.      Breath sounds: Normal breath sounds.   Abdominal:      General: Abdomen is flat.      Hernia: A hernia (Ventral as well as umbilical hernia.  These are reducible.) is present.   Musculoskeletal:      Right lower leg: No edema.      Left lower leg: No edema.   Skin:     General: Skin is warm and dry.   Neurological:      Mental Status: She is alert and oriented to person, place, and time.   Psychiatric:         Mood and Affect: Mood normal.         "

## 2024-08-06 ENCOUNTER — ANESTHESIA EVENT (OUTPATIENT)
Dept: PERIOP | Facility: HOSPITAL | Age: 49
End: 2024-08-06
Payer: COMMERCIAL

## 2024-08-10 DIAGNOSIS — G47.09 OTHER INSOMNIA: ICD-10-CM

## 2024-08-10 RX ORDER — TRAZODONE HYDROCHLORIDE 50 MG/1
50 TABLET ORAL
Qty: 30 TABLET | Refills: 0 | Status: SHIPPED | OUTPATIENT
Start: 2024-08-10

## 2024-08-12 RX ORDER — OXYCODONE AND ACETAMINOPHEN 5; 325 MG/1; MG/1
1 TABLET ORAL EVERY 4 HOURS PRN
Qty: 10 TABLET | Refills: 0 | Status: SHIPPED | OUTPATIENT
Start: 2024-08-12

## 2024-08-12 NOTE — DISCHARGE INSTR - AVS FIRST PAGE
Please call the office when you leave to schedule an appointment for 2 weeks.              Please call [phone]. [address], [address], [place], [address]. Off [address] between [address] and [address].     Activity:    As tolerated  Return to Work:   Okay to return to work when you feel well if you desire.        Diet:   You may return to your normal healthy diet.    Wound Care:  Your wound is closed with dissolvable stitches and glue.  It is okay to shower. Wash incision gently with soap and water and pat dry. Do not soak incisions in bath water or swim for two weeks. Do not apply any creams or ointments.    Pain Medication:   Please take as directed if needed. May use Advil or Motrin in addition.  Recall, the pain medicine and anesthesia is associated with constipation.    No driving while taking narcotic pain medications.      Other:  It is normal to developed a “healing ridge” / firm incision after surgery.  This is your body making scar tissue.  It is a good sign  Constipation is very common after general anesthesia.  Please use milk of magnesia as needed in order to help prevent constipation.  It is normal to get bruising after surgery.  If you have questions after discharge please call the office.    If you have increased pain, fever >101.5, increased drainage, redness or a bad smell at your surgery site, please call us immediately or come directly to the Emergency Room

## 2024-08-15 ENCOUNTER — ANESTHESIA (OUTPATIENT)
Dept: PERIOP | Facility: HOSPITAL | Age: 49
End: 2024-08-15
Payer: COMMERCIAL

## 2024-09-06 ENCOUNTER — HOSPITAL ENCOUNTER (OUTPATIENT)
Dept: RADIOLOGY | Facility: IMAGING CENTER | Age: 49
End: 2024-09-06
Payer: COMMERCIAL

## 2024-09-06 VITALS — HEIGHT: 67 IN | BODY MASS INDEX: 20.4 KG/M2 | WEIGHT: 130 LBS

## 2024-09-06 DIAGNOSIS — Z12.31 ENCOUNTER FOR SCREENING MAMMOGRAM FOR MALIGNANT NEOPLASM OF BREAST: ICD-10-CM

## 2024-09-06 PROCEDURE — 77067 SCR MAMMO BI INCL CAD: CPT

## 2024-09-06 PROCEDURE — 77063 BREAST TOMOSYNTHESIS BI: CPT

## 2024-10-08 DIAGNOSIS — G47.09 OTHER INSOMNIA: ICD-10-CM

## 2024-10-09 RX ORDER — TRAZODONE HYDROCHLORIDE 50 MG/1
50 TABLET, FILM COATED ORAL
Qty: 30 TABLET | Refills: 5 | Status: SHIPPED | OUTPATIENT
Start: 2024-10-09

## 2024-10-23 ENCOUNTER — ANESTHESIA EVENT (OUTPATIENT)
Dept: GASTROENTEROLOGY | Facility: HOSPITAL | Age: 49
End: 2024-10-23
Payer: COMMERCIAL

## 2024-10-23 ENCOUNTER — HOSPITAL ENCOUNTER (OUTPATIENT)
Dept: GASTROENTEROLOGY | Facility: HOSPITAL | Age: 49
Setting detail: OUTPATIENT SURGERY
Discharge: HOME | End: 2024-10-23
Attending: INTERNAL MEDICINE
Payer: COMMERCIAL

## 2024-10-23 ENCOUNTER — ANESTHESIA (OUTPATIENT)
Dept: GASTROENTEROLOGY | Facility: HOSPITAL | Age: 49
End: 2024-10-23
Payer: COMMERCIAL

## 2024-10-23 VITALS
HEART RATE: 65 BPM | RESPIRATION RATE: 18 BRPM | TEMPERATURE: 96.4 F | SYSTOLIC BLOOD PRESSURE: 104 MMHG | OXYGEN SATURATION: 100 % | DIASTOLIC BLOOD PRESSURE: 52 MMHG

## 2024-10-23 DIAGNOSIS — Z12.11 SCREENING FOR COLORECTAL CANCER: ICD-10-CM

## 2024-10-23 DIAGNOSIS — Z12.12 SCREENING FOR COLORECTAL CANCER: ICD-10-CM

## 2024-10-23 PROCEDURE — G0121 COLON CA SCRN NOT HI RSK IND: HCPCS | Performed by: INTERNAL MEDICINE

## 2024-10-23 RX ORDER — SODIUM CHLORIDE 9 MG/ML
INJECTION, SOLUTION INTRAVENOUS CONTINUOUS PRN
Status: DISCONTINUED | OUTPATIENT
Start: 2024-10-23 | End: 2024-10-23

## 2024-10-23 RX ORDER — SODIUM CHLORIDE 9 MG/ML
25 INJECTION, SOLUTION INTRAVENOUS CONTINUOUS
Status: CANCELLED | OUTPATIENT
Start: 2024-10-23

## 2024-10-23 RX ORDER — PROPOFOL 10 MG/ML
INJECTION, EMULSION INTRAVENOUS CONTINUOUS PRN
Status: DISCONTINUED | OUTPATIENT
Start: 2024-10-23 | End: 2024-10-23

## 2024-10-23 RX ORDER — PROPOFOL 10 MG/ML
INJECTION, EMULSION INTRAVENOUS AS NEEDED
Status: DISCONTINUED | OUTPATIENT
Start: 2024-10-23 | End: 2024-10-23

## 2024-10-23 RX ORDER — LIDOCAINE HYDROCHLORIDE 10 MG/ML
0.5 INJECTION, SOLUTION EPIDURAL; INFILTRATION; INTRACAUDAL; PERINEURAL ONCE AS NEEDED
Status: CANCELLED | OUTPATIENT
Start: 2024-10-23

## 2024-10-23 RX ORDER — LIDOCAINE HYDROCHLORIDE 10 MG/ML
INJECTION, SOLUTION EPIDURAL; INFILTRATION; INTRACAUDAL; PERINEURAL AS NEEDED
Status: DISCONTINUED | OUTPATIENT
Start: 2024-10-23 | End: 2024-10-23

## 2024-10-23 RX ADMIN — PROPOFOL 100 MCG/KG/MIN: 10 INJECTION, EMULSION INTRAVENOUS at 13:53

## 2024-10-23 RX ADMIN — LIDOCAINE HYDROCHLORIDE 30 MG: 10 INJECTION, SOLUTION EPIDURAL; INFILTRATION; INTRACAUDAL; PERINEURAL at 13:53

## 2024-10-23 RX ADMIN — PROPOFOL 40 MG: 10 INJECTION, EMULSION INTRAVENOUS at 13:56

## 2024-10-23 RX ADMIN — SODIUM CHLORIDE: 9 INJECTION, SOLUTION INTRAVENOUS at 13:45

## 2024-10-23 RX ADMIN — Medication 40 MG: at 13:59

## 2024-10-23 RX ADMIN — PROPOFOL 60 MG: 10 INJECTION, EMULSION INTRAVENOUS at 13:53

## 2024-10-23 NOTE — H&P
History and Physical - [facility]  Janiya Valle 49 y.o. female MRN: [id number]                  HPI: Janiya Valle is a 49 y.o. year old female who presents for colon cancer screening.      REVIEW OF SYSTEMS: Per the HPI, and otherwise unremarkable.    Historical Information   Past Medical History:   Diagnosis Date    Allergic     Anemia     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 (HCC)     Mitral valve prolapse      Past Surgical History:   Procedure Laterality Date    BREAST BIOPSY Left 2017    DENTAL SURGERY      US GUIDED BREAST BIOPSY LEFT COMPLETE Left 2017     Social History   Social History     Substance and Sexual Activity   Alcohol Use Yes    Alcohol/week: 2.0 standard drinks of alcohol    Types: 2 Glasses of wine per week     Social History     Substance and Sexual Activity   Drug Use No     Social History     Tobacco Use   Smoking Status Former    Current packs/day: 0.00    Types: Cigarettes    Start date: 1994    Quit date: 2004    Years since quittin.8   Smokeless Tobacco Never     Family History   Problem Relation Age of Onset    Heart disease Mother     No Known Problems Father     No Known Problems Sister     No Known Problems Sister     No Known Problems Daughter     No Known Problems Daughter     No Known Problems Maternal Grandmother     No Known Problems Maternal Grandfather     No Known Problems Paternal Grandmother     No Known Problems Paternal Grandfather     No Known Problems Paternal Aunt     Hypertension Family     Gout Family     Coronary artery disease Family     Hereditary spherocytosis Family        Meds/Allergies       Current Outpatient Medications:     Ascorbic Acid (VITAMIN C PO)    bisacodyl (DULCOLAX) 5 mg EC tablet    Cholecalciferol (Vitamin D) 50 MCG (2000) tablet    Ferrous Sulfate (IRON PO)    folic acid (FOLVITE) 1 mg tablet    metoprolol tartrate (LOPRESSOR) 25 mg tablet    Multiple Vitamins-Minerals (multivitamin with  minerals) tablet    NON FORMULARY    oxyCODONE-acetaminophen (PERCOCET) 5-325 mg per tablet    polyethylene glycol (MiraLax) 17 GM/SCOOP powder    traZODone (DESYREL) 50 mg tablet    Allergies   Allergen Reactions    Penicillins Anaphylaxis and Hives    Cephalexin        Objective     There were no vitals taken for this visit.      PHYSICAL EXAM    Gen: NAD  Head: NCAT  CV: RRR  CHEST: Clear  ABD: soft, NT/ND  EXT: no edema      ASSESSMENT/PLAN:  This is a 49 y.o. year old female here for colonoscopy, and she is stable and optimized for her procedure.

## 2024-10-23 NOTE — ANESTHESIA PREPROCEDURE EVALUATION
Procedure:  COLONOSCOPY    Relevant Problems   CARDIO   (+) Mitral valve prolapse   (+) Nonrheumatic mitral valve regurgitation      HEMATOLOGY   (+) Hereditary spherocytosis (HCC)      Other   (+) Palpitation      Denies recent fever, cough or other symptom of upper respiratory tract infection.    Confirmed NPO appropriate    Physical Exam    Airway    Mallampati score: II  TM Distance: >3 FB  Neck ROM: full     Dental   No notable dental hx     Cardiovascular      Pulmonary      Other Findings  post-pubertal.      Anesthesia Plan  ASA Score- 2     Anesthesia Type- IV sedation with anesthesia with ASA Monitors.         Additional Monitors:     Airway Plan:            Plan Factors-Exercise tolerance (METS): >4 METS.Exercise comment: Runs, able to climb two flights of stairs without cardiopulmonary limitation.    Chart reviewed.        Patient is not a current smoker.  Patient did not smoke on day of surgery.            Induction- intravenous.    Postoperative Plan-         Informed Consent- Anesthetic plan and risks discussed with patient.

## 2024-10-23 NOTE — ANESTHESIA POSTPROCEDURE EVALUATION
Post-Op Assessment Note    CV Status:  Stable  Pain Score: 0    Pain management: adequate       Mental Status:  Alert and awake   Hydration Status:  Euvolemic   PONV Controlled:  Controlled   Airway Patency:  Patent     Post Op Vitals Reviewed: Yes    No anethesia notable event occurred.    Staff: Anesthesiologist, CRNA           Last Filed PACU Vitals:  Vitals Value Taken Time   Temp 96.4 °F (35.8 °C) 10/23/24 1420   Pulse 65 10/23/24 1420   BP 90/55 10/23/24 1420   Resp 18 10/23/24 1420   SpO2 99 % 10/23/24 1420       Modified Arsenio:  Activity: 2 (10/23/2024  2:20 PM)  Respiration: 2 (10/23/2024  2:20 PM)  Circulation: 2 (10/23/2024  2:20 PM)  Consciousness: 2 (10/23/2024  2:20 PM)  Oxygen Saturation: 2 (10/23/2024  2:20 PM)  Modified Arsenio Score: 10 (10/23/2024  2:20 PM)

## 2024-11-19 ENCOUNTER — TELEPHONE (OUTPATIENT)
Dept: SURGERY | Facility: CLINIC | Age: 49
End: 2024-11-19

## 2024-11-19 NOTE — TELEPHONE ENCOUNTER
Patient needs pre-op with Dr. Hurtado.  Called and left voice mail asking her to call to schedule pre-op appointment with him.

## 2024-12-18 ENCOUNTER — OFFICE VISIT (OUTPATIENT)
Dept: SURGERY | Facility: CLINIC | Age: 49
End: 2024-12-18
Payer: COMMERCIAL

## 2024-12-18 VITALS
BODY MASS INDEX: 20.4 KG/M2 | OXYGEN SATURATION: 99 % | HEIGHT: 67 IN | DIASTOLIC BLOOD PRESSURE: 43 MMHG | SYSTOLIC BLOOD PRESSURE: 113 MMHG | HEART RATE: 68 BPM | WEIGHT: 130 LBS

## 2024-12-18 DIAGNOSIS — K42.9 UMBILICAL HERNIA WITHOUT OBSTRUCTION AND WITHOUT GANGRENE: Primary | ICD-10-CM

## 2024-12-18 DIAGNOSIS — K43.9 VENTRAL HERNIA WITHOUT OBSTRUCTION OR GANGRENE: ICD-10-CM

## 2024-12-18 PROCEDURE — 99214 OFFICE O/P EST MOD 30 MIN: CPT | Performed by: SURGERY

## 2024-12-18 NOTE — ASSESSMENT & PLAN NOTE
Risks and benefits of both an umbilical and ventral hernia repair were discussed with her including the potential for bowel injury or recurrence and she agrees to proceed

## 2024-12-18 NOTE — PROGRESS NOTES
"Name: Janiya Valle      : 1975      MRN: [id number]  Encounter Provider: Dilan Hurtado DO  Encounter Date: 2024   Encounter department: [facility] GENERAL SURGERY [facility]  :  Assessment & Plan  Umbilical hernia without obstruction and without gangrene         Ventral hernia without obstruction or gangrene  Risks and benefits of both an umbilical and ventral hernia repair were discussed with her including the potential for bowel injury or recurrence and she agrees to proceed           History of Present Illness   Pre-op Exam    Pre-operative Risk Factors:    History of cerebrovascular disease: No    History of ischemic heart disease: No  Pre-operative treatment with insulin: No  Pre-operative creatinine >2 mg/dL: No    History of congestive heart failure: No    Janiya Valle is a 49 y.o. female who presents for pre op consult.  Scheduled from ventral and umbilical hernia repair 2024.  Scheduled with my partner.  Due to scheduling conflicts I will be performing the umbilical and ventral hernia repair.  History obtained from: patient    Review of Systems   Constitutional: Negative.  Negative for activity change.   HENT: Negative.     Eyes: Negative.    Respiratory: Negative.     Cardiovascular: Negative.    Gastrointestinal:  Positive for abdominal pain.   Endocrine: Negative.    Genitourinary: Negative.    Musculoskeletal: Negative.    Skin: Negative.    Allergic/Immunologic: Negative.    Neurological: Negative.    Psychiatric/Behavioral:  Negative for agitation, behavioral problems and confusion. The patient is not nervous/anxious.      Medical History Reviewed by provider this encounter:     .     Objective   BP (!) 113/43   Pulse 68   Ht 5' 7\" (1.702 m)   Wt 59 kg (130 lb)   SpO2 99%   BMI 20.36 kg/m²      Physical Exam  Constitutional:       General: She is not in acute distress.  HENT:      Head: Atraumatic.      Mouth/Throat:      Mouth: Mucous membranes are moist. "   Eyes:      Extraocular Movements: Extraocular movements intact.   Cardiovascular:      Rate and Rhythm: Normal rate.   Pulmonary:      Effort: Pulmonary effort is normal.   Abdominal:      General: Abdomen is flat.      Palpations: Abdomen is soft.      Hernia: A hernia (Umbilical hernia 1 cm, ventral hernia 2 fingerbreadths above the umbilicus, 1 cm) is present.   Musculoskeletal:      Cervical back: Normal range of motion.   Skin:     General: Skin is warm and dry.   Neurological:      Mental Status: She is alert.

## 2024-12-18 NOTE — LETTER
2024     Gladys Herrmann DO  [address] [facility] [address]    Patient: Janiya Valle   YOB: 1975   Date of Visit: 2024       Dear Dr. Herrmann:    Thank you for referring Janiya Valle to me for evaluation. Below are my notes for this consultation.    If you have questions, please do not hesitate to call me. I look forward to following your patient along with you.         Sincerely,        Dilan Hurtado,         CC: Adenike Felipe, DO Dilan Hurtado DO  2024  4:40 PM  Sign when Signing Visit  Name: Janiya Valle      : 1975      MRN: [id number]  Encounter Provider: Dilan Hurtado DO  Encounter Date: 2024   Encounter department: [facility] GENERAL SURGERY [facility]  :  Assessment & Plan  Umbilical hernia without obstruction and without gangrene         Ventral hernia without obstruction or gangrene  Risks and benefits of both an umbilical and ventral hernia repair were discussed with her including the potential for bowel injury or recurrence and she agrees to proceed           History of Present Illness  Pre-op Exam    Pre-operative Risk Factors:    History of cerebrovascular disease: No    History of ischemic heart disease: No  Pre-operative treatment with insulin: No  Pre-operative creatinine >2 mg/dL: No    History of congestive heart failure: No    Janiya Valle is a 49 y.o. female who presents for pre op consult.  Scheduled from ventral and umbilical hernia repair 2024.  Scheduled with my partner.  Due to scheduling conflicts I will be performing the umbilical and ventral hernia repair.  History obtained from: patient    Review of Systems   Constitutional: Negative.  Negative for activity change.   HENT: Negative.     Eyes: Negative.    Respiratory: Negative.     Cardiovascular: Negative.    Gastrointestinal:  Positive for abdominal pain.   Endocrine: Negative.    Genitourinary: Negative.    Musculoskeletal:  "Negative.    Skin: Negative.    Allergic/Immunologic: Negative.    Neurological: Negative.    Psychiatric/Behavioral:  Negative for agitation, behavioral problems and confusion. The patient is not nervous/anxious.      Medical History Reviewed by provider this encounter:     .     Objective  BP (!) 113/43   Pulse 68   Ht 5' 7\" (1.702 m)   Wt 59 kg (130 lb)   SpO2 99%   BMI 20.36 kg/m²      Physical Exam  Constitutional:       General: She is not in acute distress.  HENT:      Head: Atraumatic.      Mouth/Throat:      Mouth: Mucous membranes are moist.   Eyes:      Extraocular Movements: Extraocular movements intact.   Cardiovascular:      Rate and Rhythm: Normal rate.   Pulmonary:      Effort: Pulmonary effort is normal.   Abdominal:      General: Abdomen is flat.      Palpations: Abdomen is soft.      Hernia: A hernia (Umbilical hernia 1 cm, ventral hernia 2 fingerbreadths above the umbilicus, 1 cm) is present.   Musculoskeletal:      Cervical back: Normal range of motion.   Skin:     General: Skin is warm and dry.   Neurological:      Mental Status: She is alert.           "

## 2024-12-18 NOTE — H&P (VIEW-ONLY)
"Name: Janiya Valle      : 1975      MRN: [id number]  Encounter Provider: Dilan Hurtado DO  Encounter Date: 2024   Encounter department: [facility] GENERAL SURGERY [facility]  :  Assessment & Plan  Umbilical hernia without obstruction and without gangrene         Ventral hernia without obstruction or gangrene  Risks and benefits of both an umbilical and ventral hernia repair were discussed with her including the potential for bowel injury or recurrence and she agrees to proceed           History of Present Illness   Pre-op Exam    Pre-operative Risk Factors:    History of cerebrovascular disease: No    History of ischemic heart disease: No  Pre-operative treatment with insulin: No  Pre-operative creatinine >2 mg/dL: No    History of congestive heart failure: No    Janiya Valle is a 49 y.o. female who presents for pre op consult.  Scheduled from ventral and umbilical hernia repair 2024.  Scheduled with my partner.  Due to scheduling conflicts I will be performing the umbilical and ventral hernia repair.  History obtained from: patient    Review of Systems   Constitutional: Negative.  Negative for activity change.   HENT: Negative.     Eyes: Negative.    Respiratory: Negative.     Cardiovascular: Negative.    Gastrointestinal:  Positive for abdominal pain.   Endocrine: Negative.    Genitourinary: Negative.    Musculoskeletal: Negative.    Skin: Negative.    Allergic/Immunologic: Negative.    Neurological: Negative.    Psychiatric/Behavioral:  Negative for agitation, behavioral problems and confusion. The patient is not nervous/anxious.      Medical History Reviewed by provider this encounter:     .     Objective   BP (!) 113/43   Pulse 68   Ht 5' 7\" (1.702 m)   Wt 59 kg (130 lb)   SpO2 99%   BMI 20.36 kg/m²      Physical Exam  Constitutional:       General: She is not in acute distress.  HENT:      Head: Atraumatic.      Mouth/Throat:      Mouth: Mucous membranes are moist. "   Eyes:      Extraocular Movements: Extraocular movements intact.   Cardiovascular:      Rate and Rhythm: Normal rate.   Pulmonary:      Effort: Pulmonary effort is normal.   Abdominal:      General: Abdomen is flat.      Palpations: Abdomen is soft.      Hernia: A hernia (Umbilical hernia 1 cm, ventral hernia 2 fingerbreadths above the umbilicus, 1 cm) is present.   Musculoskeletal:      Cervical back: Normal range of motion.   Skin:     General: Skin is warm and dry.   Neurological:      Mental Status: She is alert.

## 2024-12-27 ENCOUNTER — HOSPITAL ENCOUNTER (OUTPATIENT)
Facility: HOSPITAL | Age: 49
Setting detail: OUTPATIENT SURGERY
Discharge: HOME | End: 2024-12-27
Attending: SURGERY | Admitting: SURGERY
Payer: COMMERCIAL

## 2024-12-27 VITALS
SYSTOLIC BLOOD PRESSURE: 118 MMHG | HEART RATE: 66 BPM | OXYGEN SATURATION: 99 % | TEMPERATURE: 97.8 F | RESPIRATION RATE: 18 BRPM | DIASTOLIC BLOOD PRESSURE: 56 MMHG

## 2024-12-27 DIAGNOSIS — K42.9 UMBILICAL HERNIA WITHOUT OBSTRUCTION AND WITHOUT GANGRENE: Primary | ICD-10-CM

## 2024-12-27 LAB
EXT PREGNANCY TEST URINE: NEGATIVE
EXT. CONTROL: NORMAL

## 2024-12-27 PROCEDURE — 81025 URINE PREGNANCY TEST: CPT | Performed by: SURGERY

## 2024-12-27 PROCEDURE — 49591 RPR AA HRN 1ST < 3 CM RDC: CPT | Performed by: SURGERY

## 2024-12-27 RX ORDER — MIDAZOLAM HYDROCHLORIDE 2 MG/2ML
INJECTION, SOLUTION INTRAMUSCULAR; INTRAVENOUS AS NEEDED
Status: DISCONTINUED | OUTPATIENT
Start: 2024-12-27 | End: 2024-12-27

## 2024-12-27 RX ORDER — SODIUM CHLORIDE, SODIUM LACTATE, POTASSIUM CHLORIDE, CALCIUM CHLORIDE 600; 310; 30; 20 MG/100ML; MG/100ML; MG/100ML; MG/100ML
125 INJECTION, SOLUTION INTRAVENOUS CONTINUOUS
Status: DISCONTINUED | OUTPATIENT
Start: 2024-12-27 | End: 2024-12-27 | Stop reason: HOSPADM

## 2024-12-27 RX ORDER — HYDROMORPHONE HCL IN WATER/PF 6 MG/30 ML
0.2 PATIENT CONTROLLED ANALGESIA SYRINGE INTRAVENOUS ONCE AS NEEDED
Status: DISCONTINUED | OUTPATIENT
Start: 2024-12-27 | End: 2024-12-27 | Stop reason: HOSPADM

## 2024-12-27 RX ORDER — LIDOCAINE HYDROCHLORIDE 10 MG/ML
INJECTION, SOLUTION EPIDURAL; INFILTRATION; INTRACAUDAL; PERINEURAL AS NEEDED
Status: DISCONTINUED | OUTPATIENT
Start: 2024-12-27 | End: 2024-12-27

## 2024-12-27 RX ORDER — CLINDAMYCIN PHOSPHATE 900 MG/50ML
900 INJECTION, SOLUTION INTRAVENOUS EVERY 8 HOURS
Status: DISCONTINUED | OUTPATIENT
Start: 2024-12-27 | End: 2024-12-27 | Stop reason: SDUPTHER

## 2024-12-27 RX ORDER — SODIUM CHLORIDE, SODIUM LACTATE, POTASSIUM CHLORIDE, CALCIUM CHLORIDE 600; 310; 30; 20 MG/100ML; MG/100ML; MG/100ML; MG/100ML
INJECTION, SOLUTION INTRAVENOUS CONTINUOUS PRN
Status: DISCONTINUED | OUTPATIENT
Start: 2024-12-27 | End: 2024-12-27

## 2024-12-27 RX ORDER — ONDANSETRON 2 MG/ML
INJECTION INTRAMUSCULAR; INTRAVENOUS AS NEEDED
Status: DISCONTINUED | OUTPATIENT
Start: 2024-12-27 | End: 2024-12-27

## 2024-12-27 RX ORDER — KETOROLAC TROMETHAMINE 30 MG/ML
INJECTION, SOLUTION INTRAMUSCULAR; INTRAVENOUS AS NEEDED
Status: DISCONTINUED | OUTPATIENT
Start: 2024-12-27 | End: 2024-12-27

## 2024-12-27 RX ORDER — ROCURONIUM BROMIDE 10 MG/ML
INJECTION, SOLUTION INTRAVENOUS AS NEEDED
Status: DISCONTINUED | OUTPATIENT
Start: 2024-12-27 | End: 2024-12-27

## 2024-12-27 RX ORDER — FENTANYL CITRATE 50 UG/ML
INJECTION, SOLUTION INTRAMUSCULAR; INTRAVENOUS AS NEEDED
Status: DISCONTINUED | OUTPATIENT
Start: 2024-12-27 | End: 2024-12-27

## 2024-12-27 RX ORDER — BUPIVACAINE HYDROCHLORIDE AND EPINEPHRINE 2.5; 5 MG/ML; UG/ML
INJECTION, SOLUTION EPIDURAL; INFILTRATION; INTRACAUDAL; PERINEURAL AS NEEDED
Status: DISCONTINUED | OUTPATIENT
Start: 2024-12-27 | End: 2024-12-27 | Stop reason: HOSPADM

## 2024-12-27 RX ORDER — PROPOFOL 10 MG/ML
INJECTION, EMULSION INTRAVENOUS CONTINUOUS PRN
Status: DISCONTINUED | OUTPATIENT
Start: 2024-12-27 | End: 2024-12-27

## 2024-12-27 RX ORDER — EPHEDRINE SULFATE 50 MG/ML
INJECTION INTRAVENOUS AS NEEDED
Status: DISCONTINUED | OUTPATIENT
Start: 2024-12-27 | End: 2024-12-27

## 2024-12-27 RX ORDER — DIPHENHYDRAMINE HYDROCHLORIDE 50 MG/ML
12.5 INJECTION INTRAMUSCULAR; INTRAVENOUS ONCE AS NEEDED
Status: DISCONTINUED | OUTPATIENT
Start: 2024-12-27 | End: 2024-12-27 | Stop reason: HOSPADM

## 2024-12-27 RX ORDER — GLYCOPYRROLATE 0.2 MG/ML
INJECTION INTRAMUSCULAR; INTRAVENOUS AS NEEDED
Status: DISCONTINUED | OUTPATIENT
Start: 2024-12-27 | End: 2024-12-27

## 2024-12-27 RX ORDER — CLINDAMYCIN PHOSPHATE 900 MG/50ML
900 INJECTION, SOLUTION INTRAVENOUS ONCE
Status: COMPLETED | OUTPATIENT
Start: 2024-12-27 | End: 2024-12-27

## 2024-12-27 RX ORDER — FENTANYL CITRATE/PF 50 MCG/ML
25 SYRINGE (ML) INJECTION
Status: DISCONTINUED | OUTPATIENT
Start: 2024-12-27 | End: 2024-12-27 | Stop reason: HOSPADM

## 2024-12-27 RX ORDER — ONDANSETRON 2 MG/ML
4 INJECTION INTRAMUSCULAR; INTRAVENOUS EVERY 4 HOURS PRN
Status: DISCONTINUED | OUTPATIENT
Start: 2024-12-27 | End: 2024-12-27 | Stop reason: HOSPADM

## 2024-12-27 RX ORDER — OXYCODONE HYDROCHLORIDE 5 MG/1
5 TABLET ORAL EVERY 4 HOURS PRN
Refills: 0 | Status: DISCONTINUED | OUTPATIENT
Start: 2024-12-27 | End: 2024-12-27 | Stop reason: HOSPADM

## 2024-12-27 RX ORDER — ONDANSETRON 2 MG/ML
4 INJECTION INTRAMUSCULAR; INTRAVENOUS ONCE AS NEEDED
Status: DISCONTINUED | OUTPATIENT
Start: 2024-12-27 | End: 2024-12-27 | Stop reason: HOSPADM

## 2024-12-27 RX ORDER — DEXAMETHASONE SODIUM PHOSPHATE 10 MG/ML
INJECTION, SOLUTION INTRAMUSCULAR; INTRAVENOUS AS NEEDED
Status: DISCONTINUED | OUTPATIENT
Start: 2024-12-27 | End: 2024-12-27

## 2024-12-27 RX ORDER — CLINDAMYCIN PHOSPHATE 900 MG/50ML
INJECTION, SOLUTION INTRAVENOUS
Status: COMPLETED
Start: 2024-12-27 | End: 2024-12-27

## 2024-12-27 RX ORDER — PROPOFOL 10 MG/ML
INJECTION, EMULSION INTRAVENOUS AS NEEDED
Status: DISCONTINUED | OUTPATIENT
Start: 2024-12-27 | End: 2024-12-27

## 2024-12-27 RX ORDER — MAGNESIUM HYDROXIDE 1200 MG/15ML
LIQUID ORAL AS NEEDED
Status: DISCONTINUED | OUTPATIENT
Start: 2024-12-27 | End: 2024-12-27 | Stop reason: HOSPADM

## 2024-12-27 RX ORDER — HYDROMORPHONE HCL/PF 1 MG/ML
0.5 SYRINGE (ML) INJECTION
Refills: 0 | Status: DISCONTINUED | OUTPATIENT
Start: 2024-12-27 | End: 2024-12-27 | Stop reason: HOSPADM

## 2024-12-27 RX ADMIN — EPHEDRINE SULFATE 10 MG: 50 INJECTION INTRAVENOUS at 13:04

## 2024-12-27 RX ADMIN — KETOROLAC TROMETHAMINE 15 MG: 30 INJECTION, SOLUTION INTRAMUSCULAR; INTRAVENOUS at 13:11

## 2024-12-27 RX ADMIN — ONDANSETRON 4 MG: 2 INJECTION, SOLUTION INTRAMUSCULAR; INTRAVENOUS at 13:10

## 2024-12-27 RX ADMIN — ROCURONIUM 40 MG: 50 INJECTION, SOLUTION INTRAVENOUS at 12:40

## 2024-12-27 RX ADMIN — PROPOFOL 50 MG: 10 INJECTION, EMULSION INTRAVENOUS at 13:15

## 2024-12-27 RX ADMIN — OXYCODONE HYDROCHLORIDE 5 MG: 5 TABLET ORAL at 14:44

## 2024-12-27 RX ADMIN — GLYCOPYRROLATE 0.2 MG: 0.2 INJECTION INTRAMUSCULAR; INTRAVENOUS at 12:52

## 2024-12-27 RX ADMIN — MIDAZOLAM 2 MG: 1 INJECTION INTRAMUSCULAR; INTRAVENOUS at 12:33

## 2024-12-27 RX ADMIN — SUGAMMADEX 150 MG: 100 INJECTION, SOLUTION INTRAVENOUS at 13:14

## 2024-12-27 RX ADMIN — SODIUM CHLORIDE, SODIUM LACTATE, POTASSIUM CHLORIDE, AND CALCIUM CHLORIDE: .6; .31; .03; .02 INJECTION, SOLUTION INTRAVENOUS at 12:24

## 2024-12-27 RX ADMIN — FENTANYL CITRATE 50 MCG: 50 INJECTION INTRAMUSCULAR; INTRAVENOUS at 12:52

## 2024-12-27 RX ADMIN — FENTANYL CITRATE 50 MCG: 50 INJECTION INTRAMUSCULAR; INTRAVENOUS at 12:40

## 2024-12-27 RX ADMIN — CLINDAMYCIN PHOSPHATE 900 MG: 900 INJECTION, SOLUTION INTRAVENOUS at 12:40

## 2024-12-27 RX ADMIN — DEXMEDETOMIDINE 8 MCG: 100 INJECTION, SOLUTION INTRAVENOUS at 12:49

## 2024-12-27 RX ADMIN — PROPOFOL 100 MG: 10 INJECTION, EMULSION INTRAVENOUS at 13:13

## 2024-12-27 RX ADMIN — PROPOFOL 150 MG: 10 INJECTION, EMULSION INTRAVENOUS at 12:40

## 2024-12-27 RX ADMIN — LIDOCAINE HYDROCHLORIDE 50 MG: 10 INJECTION, SOLUTION EPIDURAL; INFILTRATION; INTRACAUDAL at 12:40

## 2024-12-27 RX ADMIN — DEXAMETHASONE SODIUM PHOSPHATE 5 MG: 10 INJECTION INTRAMUSCULAR; INTRAVENOUS at 12:40

## 2024-12-27 RX ADMIN — PROPOFOL 100 MCG/KG/MIN: 10 INJECTION, EMULSION INTRAVENOUS at 12:45

## 2024-12-27 NOTE — ANESTHESIA PREPROCEDURE EVALUATION
Procedure:  REPAIR HERNIA VENTRAL (Abdomen)  REPAIR HERNIA UMBILICAL (Abdomen)    Stress test 3/2024:   Stress ECG: No ST deviation is noted. Arrhythmias during stress: rare PVCs. Arrhythmias during recovery: rare PVCs. The stress ECG is negative for ischemia after maximal exercise, without reproduction of symptoms.     Holter Monitor 3/2024:  #1.  Resting normal sinus rhythm  #2.  1 episode of wide-complex tachycardia as described above.  Possibly aberrancy and 11 short runs of supraventricular tachycardia, asymptomatic  #3.  No symptoms reported    Echo 12/2024:    Left Ventricle: Left ventricular cavity size is normal. Wall thickness is normal. The left ventricular ejection fraction is 55%. Systolic function is normal. Wall motion is normal. Diastolic function is normal.    Right Ventricle: Right ventricular cavity size is normal. Systolic function is normal.    Left Atrium: The atrium is mildly dilated.    Mitral Valve: There is mild to moderate regurgitation.    Tricuspid Valve: The posterior leaflet is mildly prolapsed. There is mild regurgitation.    Prior TTE study available for comparison. Prior study date: 2/13/2020. Changes noted when compared to prior study. Changes include: Mitral regurgitation is slightly worse. .    Relevant Problems   CARDIO   (+) Chest pain   (+) Mitral valve prolapse   (+) Nonrheumatic mitral valve regurgitation      /RENAL   (+) Renal angiomyolipoma      HEMATOLOGY   (+) Hereditary spherocytosis (HCC)     Meds  Metop as needed  METS:  >4 Mets  NPO?:  930 pm     Physical Exam    Airway    Mallampati score: II         Dental   No notable dental hx     Cardiovascular  Rhythm: regular, Rate: normal, No murmur, Cardiovascular exam normal    Pulmonary  Pulmonary exam normal Breath sounds clear to auscultation, Breath sounds normal    Other Findings        Anesthesia Plan  ASA Score- 2     Anesthesia Type- general with ASA Monitors.         Additional Monitors:     Airway Plan:  ETT.           Plan Factors-Exercise tolerance (METS): >4 METS.    Chart reviewed. EKG reviewed.   Patient summary reviewed.                  Induction- intravenous.    Postoperative Plan- Plan for postoperative opioid use. Planned trial extubation        Informed Consent- Anesthetic plan and risks discussed with patient.  I personally reviewed this patient with the CRNA. Discussed and agreed on the Anesthesia Plan with the CRNA..

## 2024-12-27 NOTE — ANESTHESIA POSTPROCEDURE EVALUATION
Post-Op Assessment Note    CV Status:  Stable  Pain Score: 0    Pain management: adequate       Mental Status:  Alert   Hydration Status:  Stable   PONV Controlled:  None   Airway Patency:  Patent     Post Op Vitals Reviewed: Yes    No anethesia notable event occurred.    Staff: CRNA           Last Filed PACU Vitals:  Vitals Value Taken Time   Temp 97.6 °F (36.4 °C) 12/27/24 1328   Pulse 92 12/27/24 1331   BP 97/51 12/27/24 1329   Resp 55 12/27/24 1331   SpO2 96 % 12/27/24 1331   Vitals shown include unfiled device data.    Modified Arsenio:  Activity: 2 (12/27/2024  1:28 PM)  Respiration: 2 (12/27/2024  1:28 PM)  Circulation: 2 (12/27/2024  1:28 PM)  Consciousness: 2 (12/27/2024  1:28 PM)  Oxygen Saturation: 2 (12/27/2024  1:28 PM)  Modified Arsenio Score: 10 (12/27/2024  1:28 PM)

## 2024-12-27 NOTE — INTERVAL H&P NOTE
H&P reviewed. After examining the patient I find no changes in the patients condition since the H&P had been written.    Vitals:    12/27/24 1119   BP: 130/59   Pulse: 67   Resp: 18   Temp: 97.5 °F (36.4 °C)

## 2024-12-27 NOTE — OP NOTE
OPERATIVE REPORT  PATIENT NAME: Janiya Valle    :  1975  MRN: [id number]  Pt Location: AN OR ROOM 04    SURGERY DATE: 2024    Surgeons and Role:     * Dilan Hurtado, DO - Primary    Preop Diagnosis:  Ventral hernia [K43.9]  Umbilical hernia [K42.9]    Postoperative diagnosis:  Ventral hernia repair x 2  Umbilical hernia     Procedure(s):  REPAIR HERNIA VENTRAL X 2  REPAIR HERNIA UMBILICAL    Specimen(s):  * No specimens in log *    Estimated Blood Loss:   Minimal    Drains:  * No LDAs found *    Anesthesia Type:   General/local    Operative Indications:  Ventral hernia [K43.9]  Umbilical hernia [K42.9]      Operative Findings:  Height 67 inches weight 59 kg / 130 pounds.  BMI 20 ASA 2.  Wound class I  Prior to opening the fascia, or reducing the contents of the hernia, the hernia defect was measured. The defect measured 1 cm by 1 cm. This was repaired as described in the body of the report below.  Small umbilical hernia and 2 small ventral hernias.  The ultimate size of all 3 defects was 1 x 1 cm.    Complications:   None    Procedure and Technique:  Patient was brought the operative suite and identified by visualization, conversation, by armband.  Sequential compression pumps were placed.  She was given Ancef perioperatively.  Once under anesthesia abdomen was prepped and draped in a sterile fashion.  Timeout was performed and it was assured that the prep was dry.  Local was instilled around the umbilicus and just superiorly.  Skin incision was made in the midline 2 cm above the umbilicus and emanating down just to the left side.  Underlying subcutaneous tissue was divided with hot cautery.  Small umbilical hernia was noted with a little bit of preperitoneal fat through it.  Also dissected superiorly where we could feel some preperitoneal fat through the epigastric hernia.  Once the fat was freed up this was reduced into a small ventral hernia.  Upon further dissection there was a very small  2 to 3 mm hernia in the middle.  All 3 hernias were closed individually with 0 Ethibond in an inverted figure-of-eight fashion.  They were then reinforced with 0 Vicryl.  Irrigation was carried out.  Local was instilled.  2-0 Vicryl was used to reapproximate subcutaneous tissues.  4-0 Monocryl was used to close skin in a subcuticular fashion.  Wound was washed and dried.  Sterile skin glue was applied.  She was awakened in the operating room returned to the recovery area in stable condition having tolerated the procedure well.   I was present for the entire procedure.    Patient Disposition:  PACU              SIGNATURE: Dilan Hurtado,   DATE: December 27, 2024  TIME: 1:11 PM

## 2024-12-28 NOTE — ANESTHESIA POSTPROCEDURE EVALUATION
Post-Op Assessment Note    CV Status:  Stable    Pain management: adequate       Mental Status:  Alert and awake   Hydration Status:  Euvolemic   PONV Controlled:  Controlled   Airway Patency:  Patent     Post Op Vitals Reviewed: Yes    No anethesia notable event occurred.    Staff: Anesthesiologist, CRNA           Last Filed PACU Vitals:  Vitals Value Taken Time   Temp 97.6 °F (36.4 °C) 12/27/24 1328   Pulse 72 12/27/24 1402   /59 12/27/24 1400   Resp 19 12/27/24 1402   SpO2 98 % 12/27/24 1402   Vitals shown include unfiled device data.    Modified Arsenio:  Activity: 2 (12/27/2024  2:10 PM)  Respiration: 2 (12/27/2024  2:10 PM)  Circulation: 2 (12/27/2024  2:10 PM)  Consciousness: 2 (12/27/2024  2:10 PM)  Oxygen Saturation: 2 (12/27/2024  2:10 PM)  Modified Arsenio Score: 10 (12/27/2024  2:10 PM)

## 2025-01-16 ENCOUNTER — OFFICE VISIT (OUTPATIENT)
Dept: SURGERY | Facility: CLINIC | Age: 50
End: 2025-01-16
Payer: COMMERCIAL

## 2025-01-16 DIAGNOSIS — K42.9 UMBILICAL HERNIA WITHOUT OBSTRUCTION AND WITHOUT GANGRENE: Primary | ICD-10-CM

## 2025-01-16 DIAGNOSIS — K43.9 VENTRAL HERNIA WITHOUT OBSTRUCTION OR GANGRENE: ICD-10-CM

## 2025-01-16 PROCEDURE — 99212 OFFICE O/P EST SF 10 MIN: CPT | Performed by: SURGERY

## 2025-01-16 NOTE — PROGRESS NOTES
Name: Janiya Valle      : 1975      MRN: [id number]  Encounter Provider: Dilan Hurtado DO  Encounter Date: 2025   Encounter department: [facility] GENERAL SURGERY [facility]  :  Assessment & Plan  Umbilical hernia without obstruction and without gangrene  Status post suture repair of umbilical as well as ventral hernia x 2.  Doing quite well.  May resume unrestricted activity as of        Ventral hernia without obstruction or gangrene             History of Present Illness   HPI  Janiya Valle is a 49 y.o. female who presents post operatively.  Ventral hernia repair x2 and umbilical hernia repair 2024.  Notes some soreness as expected.  Improving  History obtained from: patient    Review of Systems   All other systems reviewed and are negative.    Medical History Reviewed by provider this encounter:     .     Objective   There were no vitals taken for this visit.     Physical Exam  Abdominal:      General: Abdomen is flat.      Palpations: Abdomen is soft.      Comments: Well-healed supraumbilical incision.  Healing ridge as expected.  No signs of hernia recurrence

## 2025-01-16 NOTE — LETTER
2025     Gladys Herrmann DO  [address] [facility] [address]    Patient: Janiya Valle   YOB: 1975   Date of Visit: 2025       Dear Dr. Herrmann:    Thank you for referring Janiya Valle to me for evaluation. Below are my notes for this consultation.    If you have questions, please do not hesitate to call me. I look forward to following your patient along with you.         Sincerely,        Dilan Hurtado DO        CC: No Recipients    Dilan Hurtado DO  2025  4:38 PM  Sign when Signing Visit  Name: Janiya Valle      : 1975      MRN: [id number]  Encounter Provider: Dilan Hurtado DO  Encounter Date: 2025   Encounter department: [facility] GENERAL SURGERY [facility]  :  Assessment & Plan  Umbilical hernia without obstruction and without gangrene  Status post suture repair of umbilical as well as ventral hernia x 2.  Doing quite well.  May resume unrestricted activity as of        Ventral hernia without obstruction or gangrene             History of Present Illness  HPI  Janiya Valle is a 49 y.o. female who presents post operatively.  Ventral hernia repair x2 and umbilical hernia repair 2024.  Notes some soreness as expected.  Improving  History obtained from: patient    Review of Systems   All other systems reviewed and are negative.    Medical History Reviewed by provider this encounter:     .     Objective  There were no vitals taken for this visit.     Physical Exam  Abdominal:      General: Abdomen is flat.      Palpations: Abdomen is soft.      Comments: Well-healed supraumbilical incision.  Healing ridge as expected.  No signs of hernia recurrence

## 2025-01-16 NOTE — ASSESSMENT & PLAN NOTE
Status post suture repair of umbilical as well as ventral hernia x 2.  Doing quite well.  May resume unrestricted activity as of January 27

## 2025-05-17 DIAGNOSIS — G47.09 OTHER INSOMNIA: ICD-10-CM

## 2025-05-18 RX ORDER — TRAZODONE HYDROCHLORIDE 50 MG/1
50 TABLET ORAL
Qty: 30 TABLET | Refills: 0 | Status: SHIPPED | OUTPATIENT
Start: 2025-05-18

## 2025-06-19 ENCOUNTER — OFFICE VISIT (OUTPATIENT)
Dept: FAMILY MEDICINE CLINIC | Facility: CLINIC | Age: 50
End: 2025-06-19
Payer: COMMERCIAL

## 2025-06-19 VITALS
WEIGHT: 132.25 LBS | RESPIRATION RATE: 18 BRPM | DIASTOLIC BLOOD PRESSURE: 70 MMHG | HEART RATE: 57 BPM | TEMPERATURE: 97.6 F | SYSTOLIC BLOOD PRESSURE: 110 MMHG | BODY MASS INDEX: 20.76 KG/M2 | OXYGEN SATURATION: 99 % | HEIGHT: 67 IN

## 2025-06-19 DIAGNOSIS — Z13.6 SCREENING FOR CARDIOVASCULAR CONDITION: ICD-10-CM

## 2025-06-19 DIAGNOSIS — Z13.1 SCREENING FOR DIABETES MELLITUS (DM): ICD-10-CM

## 2025-06-19 DIAGNOSIS — Z00.00 ANNUAL PHYSICAL EXAM: ICD-10-CM

## 2025-06-19 DIAGNOSIS — R00.2 PALPITATION: Primary | ICD-10-CM

## 2025-06-19 DIAGNOSIS — D58.0 HEREDITARY SPHEROCYTOSIS (HCC): Chronic | ICD-10-CM

## 2025-06-19 DIAGNOSIS — G47.09 OTHER INSOMNIA: ICD-10-CM

## 2025-06-19 DIAGNOSIS — E55.9 VITAMIN D DEFICIENCY: ICD-10-CM

## 2025-06-19 DIAGNOSIS — Z12.31 ENCOUNTER FOR SCREENING MAMMOGRAM FOR BREAST CANCER: ICD-10-CM

## 2025-06-19 PROCEDURE — 99396 PREV VISIT EST AGE 40-64: CPT | Performed by: FAMILY MEDICINE

## 2025-06-19 PROCEDURE — 99213 OFFICE O/P EST LOW 20 MIN: CPT | Performed by: FAMILY MEDICINE

## 2025-06-19 RX ORDER — TRAZODONE HYDROCHLORIDE 50 MG/1
50 TABLET ORAL
Qty: 90 TABLET | Refills: 1 | Status: SHIPPED | OUTPATIENT
Start: 2025-06-19

## 2025-06-19 NOTE — ASSESSMENT & PLAN NOTE
Trazodone 25-50 mg HS works well to help her sleep. Continue current regimen and follow up if any new concerns.  Orders:  •  traZODone (DESYREL) 50 mg tablet; Take 1 tablet (50 mg total) by mouth daily at bedtime as needed for sleep

## 2025-06-19 NOTE — ASSESSMENT & PLAN NOTE
Orders:  •  TIBC Panel (incl. Iron, TIBC, % Iron Saturation); Future  •  Folate; Future  •  Vitamin B12; Future  •  CBC and differential; Future

## 2025-06-19 NOTE — PROGRESS NOTES
Adult Annual Physical  Name: Janiya Valle      : 1975      MRN: [id number]  Encounter Provider: Gladys eHrrmann DO  Encounter Date: 2025   Encounter department: [facility]    :  Assessment & Plan  Other insomnia  Trazodone 25-50 mg HS works well to help her sleep. Continue current regimen and follow up if any new concerns.  Orders:  •  traZODone (DESYREL) 50 mg tablet; Take 1 tablet (50 mg total) by mouth daily at bedtime as needed for sleep    Encounter for screening mammogram for breast cancer    Orders:  •  Mammo screening bilateral w 3d and cad; Future    Palpitation  Would like to establish with a female cardiologist, referral provided. Previous cardiac testing has been unremarkable. Palpitations are infrequent, has metoprolol for use prn.  Orders:  •  Ambulatory Referral to Cardiology; Future  •  TSH, 3rd generation with Free T4 reflex; Future    Hereditary spherocytosis (HCC)        Orders:  •  TIBC Panel (incl. Iron, TIBC, % Iron Saturation); Future  •  Folate; Future  •  Vitamin B12; Future  •  CBC and differential; Future    Vitamin D deficiency    Orders:  •  Vitamin D 25 hydroxy; Future    Screening for cardiovascular condition    Orders:  •  Lipid panel; Future    Screening for diabetes mellitus (DM)    Orders:  •  Hemoglobin A1C; Future    Annual physical exam             Preventive Screenings:    - Cervical cancer screening: screening up-to-date   - Breast cancer screening: screening up-to-date     Immunizations:  - Immunizations due: Prevnar 20 and Zoster (Shingrix)         History of Present Illness       Perimenopausal, no period in 4 months. Trazodone 25 mg working well for sleep, 50 mg makes her eyes puffy.     Has not really needed to use Metoprolol for palpitations. Established with cardiology.    Adult Annual Physical:  Patient presents for annual physical.     Diet and Physical Activity:  - Diet/Nutrition: well balanced diet.  - Exercise: 5-7 times a  "week on average.    Depression Screening:  - PHQ-2 Score: 0    General Health:  - Sleep: sleeps well.  - Hearing: normal hearing bilateral ears.  - Vision: no vision problems and wears glasses.  - Dental: regular dental visits.    /GYN Health:  - Follows with GYN: yes.   - Menopause: perimenopausal.     Review of Systems      Objective   /70 (Patient Position: Sitting, Cuff Size: Adult)   Pulse 57   Temp 97.6 °F (36.4 °C) (Temporal)   Resp 18   Ht 5' 7\" (1.702 m)   Wt 60 kg (132 lb 4 oz)   SpO2 99%   BMI 20.71 kg/m²     Physical Exam  Vitals reviewed.   Constitutional:       Appearance: Normal appearance.     Eyes:      Extraocular Movements: Extraocular movements intact.      Conjunctiva/sclera: Conjunctivae normal.       Cardiovascular:      Rate and Rhythm: Normal rate and regular rhythm.      Heart sounds: Normal heart sounds.   Pulmonary:      Effort: Pulmonary effort is normal.      Breath sounds: Normal breath sounds.   Abdominal:      General: Abdomen is flat.     Skin:     General: Skin is warm and dry.      Capillary Refill: Capillary refill takes less than 2 seconds.     Neurological:      Mental Status: She is alert.     Psychiatric:         Mood and Affect: Mood normal.         Behavior: Behavior normal.         "

## 2025-06-19 NOTE — ASSESSMENT & PLAN NOTE
Would like to establish with a female cardiologist, referral provided. Previous cardiac testing has been unremarkable. Palpitations are infrequent, has metoprolol for use prn.  Orders:  •  Ambulatory Referral to Cardiology; Future  •  TSH, 3rd generation with Free T4 reflex; Future

## 2025-06-24 ENCOUNTER — APPOINTMENT (OUTPATIENT)
Dept: LAB | Age: 50
End: 2025-06-24
Attending: FAMILY MEDICINE
Payer: COMMERCIAL

## 2025-06-24 DIAGNOSIS — Z13.1 SCREENING FOR DIABETES MELLITUS (DM): ICD-10-CM

## 2025-06-24 DIAGNOSIS — D58.0 HEREDITARY SPHEROCYTOSIS (HCC): Chronic | ICD-10-CM

## 2025-06-24 DIAGNOSIS — E55.9 VITAMIN D DEFICIENCY: ICD-10-CM

## 2025-06-24 DIAGNOSIS — R00.2 PALPITATION: ICD-10-CM

## 2025-06-24 DIAGNOSIS — Z13.6 SCREENING FOR CARDIOVASCULAR CONDITION: ICD-10-CM

## 2025-06-24 LAB
25(OH)D3 SERPL-MCNC: 42.5 NG/ML (ref 30–100)
BASOPHILS # BLD AUTO: 0.02 THOUSANDS/ÂΜL (ref 0–0.1)
BASOPHILS NFR BLD AUTO: 0 % (ref 0–1)
CHOLEST SERPL-MCNC: 143 MG/DL (ref ?–200)
EOSINOPHIL # BLD AUTO: 0.03 THOUSAND/ÂΜL (ref 0–0.61)
EOSINOPHIL NFR BLD AUTO: 1 % (ref 0–6)
ERYTHROCYTE [DISTWIDTH] IN BLOOD BY AUTOMATED COUNT: 16.1 % (ref 11.6–15.1)
FOLATE SERPL-MCNC: >22.3 NG/ML
HCT VFR BLD AUTO: 35.6 % (ref 34.8–46.1)
HDLC SERPL-MCNC: 73 MG/DL
HGB BLD-MCNC: 12.1 G/DL (ref 11.5–15.4)
IMM GRANULOCYTES # BLD AUTO: 0.02 THOUSAND/UL (ref 0–0.2)
IMM GRANULOCYTES NFR BLD AUTO: 0 % (ref 0–2)
IRON SATN MFR SERPL: 42 % (ref 15–50)
IRON SERPL-MCNC: 114 UG/DL (ref 50–212)
LDLC SERPL CALC-MCNC: 59 MG/DL (ref 0–100)
LYMPHOCYTES # BLD AUTO: 0.87 THOUSANDS/ÂΜL (ref 0.6–4.47)
LYMPHOCYTES NFR BLD AUTO: 19 % (ref 14–44)
MCH RBC QN AUTO: 28 PG (ref 26.8–34.3)
MCHC RBC AUTO-ENTMCNC: 34 G/DL (ref 31.4–37.4)
MCV RBC AUTO: 82 FL (ref 82–98)
MONOCYTES # BLD AUTO: 0.25 THOUSAND/ÂΜL (ref 0.17–1.22)
MONOCYTES NFR BLD AUTO: 6 % (ref 4–12)
NEUTROPHILS # BLD AUTO: 3.33 THOUSANDS/ÂΜL (ref 1.85–7.62)
NEUTS SEG NFR BLD AUTO: 74 % (ref 43–75)
NONHDLC SERPL-MCNC: 70 MG/DL
NRBC BLD AUTO-RTO: 0 /100 WBCS
PLATELET # BLD AUTO: 143 THOUSANDS/UL (ref 149–390)
PMV BLD AUTO: 11.1 FL (ref 8.9–12.7)
RBC # BLD AUTO: 4.32 MILLION/UL (ref 3.81–5.12)
TIBC SERPL-MCNC: 270.2 UG/DL (ref 250–450)
TRANSFERRIN SERPL-MCNC: 193 MG/DL (ref 203–362)
TRIGL SERPL-MCNC: 54 MG/DL (ref ?–150)
TSH SERPL DL<=0.05 MIU/L-ACNC: 3.43 UIU/ML (ref 0.45–4.5)
UIBC SERPL-MCNC: 156 UG/DL (ref 155–355)
VIT B12 SERPL-MCNC: 392 PG/ML (ref 180–914)
WBC # BLD AUTO: 4.52 THOUSAND/UL (ref 4.31–10.16)

## 2025-06-24 PROCEDURE — 83036 HEMOGLOBIN GLYCOSYLATED A1C: CPT

## 2025-06-24 PROCEDURE — 83540 ASSAY OF IRON: CPT

## 2025-06-24 PROCEDURE — 83550 IRON BINDING TEST: CPT

## 2025-06-24 PROCEDURE — 80061 LIPID PANEL: CPT

## 2025-06-24 PROCEDURE — 82306 VITAMIN D 25 HYDROXY: CPT

## 2025-06-24 PROCEDURE — 36415 COLL VENOUS BLD VENIPUNCTURE: CPT

## 2025-06-24 PROCEDURE — 82746 ASSAY OF FOLIC ACID SERUM: CPT

## 2025-06-24 PROCEDURE — 84443 ASSAY THYROID STIM HORMONE: CPT

## 2025-06-24 PROCEDURE — 82607 VITAMIN B-12: CPT

## 2025-06-24 PROCEDURE — 85025 COMPLETE CBC W/AUTO DIFF WBC: CPT

## 2025-06-25 LAB
EST. AVERAGE GLUCOSE BLD GHB EST-MCNC: <74 MG/DL
HBA1C MFR BLD: <4.2 %

## 2025-07-22 ENCOUNTER — ANNUAL EXAM (OUTPATIENT)
Dept: OBGYN CLINIC | Facility: CLINIC | Age: 50
End: 2025-07-22
Payer: COMMERCIAL

## 2025-07-22 VITALS
HEIGHT: 67 IN | DIASTOLIC BLOOD PRESSURE: 72 MMHG | SYSTOLIC BLOOD PRESSURE: 114 MMHG | WEIGHT: 131.8 LBS | BODY MASS INDEX: 20.69 KG/M2

## 2025-07-22 DIAGNOSIS — Z12.11 COLON CANCER SCREENING: ICD-10-CM

## 2025-07-22 DIAGNOSIS — Z01.419 ENCOUNTER FOR ANNUAL ROUTINE GYNECOLOGICAL EXAMINATION: Primary | ICD-10-CM

## 2025-07-22 DIAGNOSIS — N95.1 PERIMENOPAUSE: ICD-10-CM

## 2025-07-22 DIAGNOSIS — Z12.31 ENCOUNTER FOR SCREENING MAMMOGRAM FOR MALIGNANT NEOPLASM OF BREAST: ICD-10-CM

## 2025-07-22 DIAGNOSIS — N95.2 VAGINAL ATROPHY: ICD-10-CM

## 2025-07-22 DIAGNOSIS — D58.0 HEREDITARY SPHEROCYTOSIS (HCC): ICD-10-CM

## 2025-07-22 PROCEDURE — S0612 ANNUAL GYNECOLOGICAL EXAMINA: HCPCS | Performed by: OBSTETRICS & GYNECOLOGY

## 2025-07-22 NOTE — PROGRESS NOTES
Name: Janiya Valle      : 1975      MRN: [id number]  Encounter Provider: Adenike Felipe DO  Encounter Date: 2025   Encounter department: [facility]  :  Assessment & Plan  Encounter for annual routine gynecological examination         Encounter for screening mammogram for malignant neoplasm of breast         Hereditary spherocytosis (HCC)         Perimenopause         Colon cancer screening         Vaginal atrophy         Pap deferred due to low risk.  Encouraged self breast examination as well as calcium supplementation.  Continue annual mammogram.  Reviewed colon cancer screening, up to date.  Reviewed signs and symptoms of perimenopause.  She will keep menstrual diary.  Discussed treatment options for vaginal atrophy including over-the-counter agents.  All questions answered.  She will return to office in 1 year or as needed    History of Present Illness   HPI  Janiya Valle is a 50 y.o. female who presents     This is a pleasant 50-year-old female P2 ( x 2, age 18, 13) presents for her GYN exam.  Her last menstrual cycle was 2025.  She states her hot flashes and night sweats have improved.  She does get slight breast tenderness on a monthly basis.  She denies any changes in bowel or bladder function.  She has been in a monogamous relationship for over 25 years.  Pap smears have been normal.    LMP 2025    Nite sweats       Cardiac workup 2024 including EKG, Holter monitor, given beta-blocker as needed for palpitations    Loestrin , discontinued 2022 for mood swings irritability    Pap     Colonoscopy 10/2024 f/u 10 yrs    Mammogram 2024, scheduled     Follows up with dermatology annually    2024 ventral hernia, umbilical hernia repair    History obtained from: patient    Review of Systems   Constitutional:  Negative for fatigue, fever and unexpected weight change.   Respiratory:  Negative for cough, chest tightness, shortness of breath and wheezing.   "  Cardiovascular: Negative.  Negative for chest pain and palpitations.   Gastrointestinal: Negative.  Negative for abdominal distention, abdominal pain, blood in stool, constipation, diarrhea, nausea and vomiting.   Genitourinary: Negative.  Negative for difficulty urinating, dyspareunia, dysuria, flank pain, frequency, genital sores, hematuria, pelvic pain, urgency, vaginal bleeding, vaginal discharge and vaginal pain.   Skin:  Negative for rash.     Medications Ordered Prior to Encounter[1]      Objective   /72   Ht 5' 7\" (1.702 m)   Wt 59.8 kg (131 lb 12.8 oz)   LMP 02/20/2025 (Approximate)   BMI 20.64 kg/m²      Physical Exam  Constitutional:       Appearance: Normal appearance. She is well-developed.   HENT:      Head: Normocephalic and atraumatic.     Cardiovascular:      Rate and Rhythm: Normal rate and regular rhythm.   Pulmonary:      Effort: Pulmonary effort is normal.      Breath sounds: Normal breath sounds.   Chest:   Breasts:     Right: No inverted nipple, mass, nipple discharge, skin change or tenderness.      Left: No inverted nipple, mass, nipple discharge, skin change or tenderness.   Abdominal:      General: Bowel sounds are normal. There is no distension.      Palpations: Abdomen is soft.      Tenderness: There is no abdominal tenderness. There is no guarding or rebound.   Genitourinary:     Labia:         Right: No rash, tenderness or lesion.         Left: No rash, tenderness or lesion.       Vagina: Normal. No signs of injury. No vaginal discharge or tenderness.      Cervix: No cervical motion tenderness, discharge, friability, lesion or cervical bleeding.      Uterus: Not enlarged and not tender.       Adnexa:         Right: No mass, tenderness or fullness.          Left: No mass, tenderness or fullness.       Neurological:      Mental Status: She is alert.     Psychiatric:         Behavior: Behavior normal.     External genitalia is within normal limits.  The vagina is evident of " slight estrogen deficiency.  There is no pelvic floor prolapse.    Administrative Statements   I have spent a total time of 25 minutes in caring for this patient on the day of the visit/encounter including Risks and benefits of tx options, Instructions for management, Impressions, Counseling / Coordination of care, Documenting in the medical record, Reviewing/placing orders in the medical record (including tests, medications, and/or procedures), and Obtaining or reviewing history  .       [1]   Current Outpatient Medications on File Prior to Visit   Medication Sig Dispense Refill    folic acid (FOLVITE) 1 mg tablet Take 1 tablet (1 mg total) by mouth daily 90 tablet 3    metoprolol tartrate (LOPRESSOR) 25 mg tablet Take one tablet daily as needed for palpitations 30 tablet 6    traZODone (DESYREL) 50 mg tablet Take 1 tablet (50 mg total) by mouth daily at bedtime as needed for sleep 90 tablet 1    NON FORMULARY Dim plus estrogen metabolism (Patient not taking: Reported on 7/22/2025)      [DISCONTINUED] Ascorbic Acid (VITAMIN C PO) Take by mouth      [DISCONTINUED] Cholecalciferol (Vitamin D) 50 MCG (2000 UT) tablet Take 2,000 Units by mouth in the morning.      [DISCONTINUED] Ferrous Sulfate (IRON PO) Take by mouth      [DISCONTINUED] Multiple Vitamins-Minerals (multivitamin with minerals) tablet Take 1 tablet by mouth in the morning.      [DISCONTINUED] oxyCODONE-acetaminophen (PERCOCET) 5-325 mg per tablet Take 1 tablet by mouth every 4 (four) hours as needed for moderate pain for up to 10 doses Max Daily Amount: 6 tablets 10 tablet 0     No current facility-administered medications on file prior to visit.

## 2025-08-04 ENCOUNTER — OFFICE VISIT (OUTPATIENT)
Dept: FAMILY MEDICINE CLINIC | Facility: CLINIC | Age: 50
End: 2025-08-04
Payer: COMMERCIAL

## 2025-08-04 VITALS
RESPIRATION RATE: 16 BRPM | TEMPERATURE: 98.2 F | WEIGHT: 130.2 LBS | BODY MASS INDEX: 20.44 KG/M2 | HEIGHT: 67 IN | DIASTOLIC BLOOD PRESSURE: 70 MMHG | OXYGEN SATURATION: 99 % | SYSTOLIC BLOOD PRESSURE: 102 MMHG | HEART RATE: 73 BPM

## 2025-08-04 DIAGNOSIS — M25.50 POLYARTHRALGIA: Primary | ICD-10-CM

## 2025-08-04 PROCEDURE — 99213 OFFICE O/P EST LOW 20 MIN: CPT | Performed by: FAMILY MEDICINE

## 2025-08-05 ENCOUNTER — APPOINTMENT (OUTPATIENT)
Dept: RADIOLOGY | Age: 50
End: 2025-08-05
Payer: COMMERCIAL

## 2025-08-05 ENCOUNTER — APPOINTMENT (OUTPATIENT)
Dept: LAB | Age: 50
End: 2025-08-05
Payer: COMMERCIAL

## 2025-08-05 DIAGNOSIS — M25.50 POLYARTHRALGIA: ICD-10-CM

## 2025-08-05 DIAGNOSIS — M25.50 PAIN IN JOINT, MULTIPLE SITES: ICD-10-CM

## 2025-08-05 LAB
ALBUMIN SERPL BCG-MCNC: 4.9 G/DL (ref 3.5–5)
ALP SERPL-CCNC: 61 U/L (ref 34–104)
ALT SERPL W P-5'-P-CCNC: 16 U/L (ref 7–52)
ANION GAP SERPL CALCULATED.3IONS-SCNC: 9 MMOL/L (ref 4–13)
AST SERPL W P-5'-P-CCNC: 18 U/L (ref 13–39)
B BURGDOR IGG+IGM SER QL IA: NEGATIVE
BILIRUB SERPL-MCNC: 2.05 MG/DL (ref 0.2–1)
BUN SERPL-MCNC: 16 MG/DL (ref 5–25)
CALCIUM SERPL-MCNC: 9.7 MG/DL (ref 8.4–10.2)
CHLORIDE SERPL-SCNC: 102 MMOL/L (ref 96–108)
CO2 SERPL-SCNC: 28 MMOL/L (ref 21–32)
CREAT SERPL-MCNC: 0.69 MG/DL (ref 0.6–1.3)
CRP SERPL QL: <1 MG/L
ERYTHROCYTE [DISTWIDTH] IN BLOOD BY AUTOMATED COUNT: 14.8 % (ref 11.6–15.1)
ERYTHROCYTE [SEDIMENTATION RATE] IN BLOOD: <1 MM/HOUR (ref 0–29)
GFR SERPL CREATININE-BSD FRML MDRD: 101 ML/MIN/1.73SQ M
GLUCOSE P FAST SERPL-MCNC: 114 MG/DL (ref 65–99)
HCT VFR BLD AUTO: 36.1 % (ref 34.8–46.1)
HGB BLD-MCNC: 12.4 G/DL (ref 11.5–15.4)
MCH RBC QN AUTO: 28.3 PG (ref 26.8–34.3)
MCHC RBC AUTO-ENTMCNC: 34.3 G/DL (ref 31.4–37.4)
MCV RBC AUTO: 82 FL (ref 82–98)
PLATELET # BLD AUTO: 128 THOUSANDS/UL (ref 149–390)
PMV BLD AUTO: 12.4 FL (ref 8.9–12.7)
POTASSIUM SERPL-SCNC: 4.2 MMOL/L (ref 3.5–5.3)
PROT SERPL-MCNC: 7.1 G/DL (ref 6.4–8.4)
RBC # BLD AUTO: 4.38 MILLION/UL (ref 3.81–5.12)
RHEUMATOID FACT SERPL-ACNC: <10 IU/ML
SODIUM SERPL-SCNC: 139 MMOL/L (ref 135–147)
URATE SERPL-MCNC: 4.6 MG/DL (ref 2–7.5)
WBC # BLD AUTO: 4.76 THOUSAND/UL (ref 4.31–10.16)

## 2025-08-05 PROCEDURE — 86235 NUCLEAR ANTIGEN ANTIBODY: CPT

## 2025-08-05 PROCEDURE — 84550 ASSAY OF BLOOD/URIC ACID: CPT

## 2025-08-05 PROCEDURE — 86140 C-REACTIVE PROTEIN: CPT

## 2025-08-05 PROCEDURE — 73562 X-RAY EXAM OF KNEE 3: CPT

## 2025-08-05 PROCEDURE — 85652 RBC SED RATE AUTOMATED: CPT

## 2025-08-05 PROCEDURE — 86618 LYME DISEASE ANTIBODY: CPT

## 2025-08-05 PROCEDURE — 86431 RHEUMATOID FACTOR QUANT: CPT

## 2025-08-05 PROCEDURE — 86200 CCP ANTIBODY: CPT

## 2025-08-05 PROCEDURE — 80053 COMPREHEN METABOLIC PANEL: CPT

## 2025-08-05 PROCEDURE — 85027 COMPLETE CBC AUTOMATED: CPT

## 2025-08-05 PROCEDURE — 36415 COLL VENOUS BLD VENIPUNCTURE: CPT

## 2025-08-06 LAB
CCP AB SER IA-ACNC: 1.3 (ref ?–10)
ENA SS-A AB SER IA-ACNC: <0.5 U/ML (ref ?–10)
ENA SS-B IGG SER IA-ACNC: <0.6 U/ML (ref ?–10)

## 2025-08-11 ENCOUNTER — TELEPHONE (OUTPATIENT)
Age: 50
End: 2025-08-11

## (undated) DEVICE — SUT MONOCRYL 4-0 PS-2 27 IN Y426H

## (undated) DEVICE — BULB SYRINGE,IRRIGATION WITH PROTECTIVE CAP: Brand: DOVER

## (undated) DEVICE — CHLORAPREP HI-LITE 26ML ORANGE

## (undated) DEVICE — PAD GROUNDING DUAL ADULT

## (undated) DEVICE — GLOVE SRG BIOGEL ORTHOPEDIC 8

## (undated) DEVICE — SUT VICRYL 2-0 SH 27 IN UNDYED J417H

## (undated) DEVICE — TOWEL SURG XR DETECT GREEN STRL RFD

## (undated) DEVICE — DECANTER: Brand: UNBRANDED

## (undated) DEVICE — PLUMEPEN PRO 10FT

## (undated) DEVICE — EXOFIN PRECISION PEN HIGH VISCOSITY TOPICAL SKIN ADHESIVE: Brand: EXOFIN PRECISION PEN, 1G

## (undated) DEVICE — SUT VICRYL 0 UR-6 27 IN J603H

## (undated) DEVICE — INTENDED FOR TISSUE SEPARATION, AND OTHER PROCEDURES THAT REQUIRE A SHARP SURGICAL BLADE TO PUNCTURE OR CUT.: Brand: BARD-PARKER SAFETY BLADES SIZE 15, STERILE

## (undated) DEVICE — BETHLEHEM UNIVERSAL MINOR GEN: Brand: CARDINAL HEALTH

## (undated) DEVICE — NEEDLE 23G X 1 1/2 SAFETY-GLIDE THIN WALL